# Patient Record
Sex: MALE | Race: WHITE | NOT HISPANIC OR LATINO | Employment: UNEMPLOYED | URBAN - METROPOLITAN AREA
[De-identification: names, ages, dates, MRNs, and addresses within clinical notes are randomized per-mention and may not be internally consistent; named-entity substitution may affect disease eponyms.]

---

## 2018-09-10 ENCOUNTER — APPOINTMENT (EMERGENCY)
Dept: CT IMAGING | Facility: HOSPITAL | Age: 23
DRG: 245 | End: 2018-09-10
Payer: COMMERCIAL

## 2018-09-10 ENCOUNTER — HOSPITAL ENCOUNTER (INPATIENT)
Facility: HOSPITAL | Age: 23
LOS: 3 days | Discharge: HOME | DRG: 245 | End: 2018-09-13
Attending: EMERGENCY MEDICINE | Admitting: SURGERY
Payer: COMMERCIAL

## 2018-09-10 DIAGNOSIS — K52.9 ILEITIS: Primary | ICD-10-CM

## 2018-09-10 DIAGNOSIS — K50.014: ICD-10-CM

## 2018-09-10 DIAGNOSIS — R18.8 ABDOMINAL FLUID COLLECTION: ICD-10-CM

## 2018-09-10 LAB
ANION GAP SERPL CALCULATED.3IONS-SCNC: 8 MMOL/L (ref 4–13)
BACTERIA UR QL AUTO: ABNORMAL /HPF
BASOPHILS # BLD AUTO: 0.03 THOUSANDS/ΜL (ref 0–0.1)
BASOPHILS NFR BLD AUTO: 0 % (ref 0–1)
BILIRUB UR QL STRIP: NEGATIVE
BUN SERPL-MCNC: 10 MG/DL (ref 5–25)
CALCIUM SERPL-MCNC: 9.4 MG/DL (ref 8.3–10.1)
CHLORIDE SERPL-SCNC: 99 MMOL/L (ref 100–108)
CLARITY UR: CLEAR
CO2 SERPL-SCNC: 28 MMOL/L (ref 21–32)
COLOR UR: YELLOW
CREAT SERPL-MCNC: 1.25 MG/DL (ref 0.6–1.3)
EOSINOPHIL # BLD AUTO: 0.01 THOUSAND/ΜL (ref 0–0.61)
EOSINOPHIL NFR BLD AUTO: 0 % (ref 0–6)
ERYTHROCYTE [DISTWIDTH] IN BLOOD BY AUTOMATED COUNT: 12.3 % (ref 11.6–15.1)
GFR SERPL CREATININE-BSD FRML MDRD: 80 ML/MIN/1.73SQ M
GLUCOSE SERPL-MCNC: 107 MG/DL (ref 65–140)
GLUCOSE UR STRIP-MCNC: NEGATIVE MG/DL
HCT VFR BLD AUTO: 45.2 % (ref 36.5–49.3)
HGB BLD-MCNC: 15.8 G/DL (ref 12–17)
HGB UR QL STRIP.AUTO: ABNORMAL
IMM GRANULOCYTES # BLD AUTO: 0.12 THOUSAND/UL (ref 0–0.2)
IMM GRANULOCYTES NFR BLD AUTO: 1 % (ref 0–2)
KETONES UR STRIP-MCNC: NEGATIVE MG/DL
LEUKOCYTE ESTERASE UR QL STRIP: NEGATIVE
LYMPHOCYTES # BLD AUTO: 0.67 THOUSANDS/ΜL (ref 0.6–4.47)
LYMPHOCYTES NFR BLD AUTO: 4 % (ref 14–44)
MCH RBC QN AUTO: 31.9 PG (ref 26.8–34.3)
MCHC RBC AUTO-ENTMCNC: 35 G/DL (ref 31.4–37.4)
MCV RBC AUTO: 91 FL (ref 82–98)
MONOCYTES # BLD AUTO: 1.53 THOUSAND/ΜL (ref 0.17–1.22)
MONOCYTES NFR BLD AUTO: 8 % (ref 4–12)
NEUTROPHILS # BLD AUTO: 16.59 THOUSANDS/ΜL (ref 1.85–7.62)
NEUTS SEG NFR BLD AUTO: 87 % (ref 43–75)
NITRITE UR QL STRIP: NEGATIVE
NON-SQ EPI CELLS URNS QL MICRO: ABNORMAL /HPF
NRBC BLD AUTO-RTO: 0 /100 WBCS
PH UR STRIP.AUTO: 6.5 [PH] (ref 4.5–8)
PLATELET # BLD AUTO: 227 THOUSANDS/UL (ref 149–390)
PMV BLD AUTO: 11.5 FL (ref 8.9–12.7)
POTASSIUM SERPL-SCNC: 3.7 MMOL/L (ref 3.5–5.3)
PROT UR STRIP-MCNC: ABNORMAL MG/DL
RBC # BLD AUTO: 4.96 MILLION/UL (ref 3.88–5.62)
RBC #/AREA URNS AUTO: ABNORMAL /HPF
SODIUM SERPL-SCNC: 135 MMOL/L (ref 136–145)
SP GR UR STRIP.AUTO: <=1.005 (ref 1–1.03)
UROBILINOGEN UR QL STRIP.AUTO: 0.2 E.U./DL
WBC # BLD AUTO: 18.95 THOUSAND/UL (ref 4.31–10.16)
WBC #/AREA URNS AUTO: ABNORMAL /HPF

## 2018-09-10 PROCEDURE — 81001 URINALYSIS AUTO W/SCOPE: CPT | Performed by: EMERGENCY MEDICINE

## 2018-09-10 PROCEDURE — 36415 COLL VENOUS BLD VENIPUNCTURE: CPT | Performed by: EMERGENCY MEDICINE

## 2018-09-10 PROCEDURE — 80048 BASIC METABOLIC PNL TOTAL CA: CPT | Performed by: EMERGENCY MEDICINE

## 2018-09-10 PROCEDURE — 99285 EMERGENCY DEPT VISIT HI MDM: CPT

## 2018-09-10 PROCEDURE — 96374 THER/PROPH/DIAG INJ IV PUSH: CPT

## 2018-09-10 PROCEDURE — 85025 COMPLETE CBC W/AUTO DIFF WBC: CPT | Performed by: EMERGENCY MEDICINE

## 2018-09-10 PROCEDURE — 74177 CT ABD & PELVIS W/CONTRAST: CPT

## 2018-09-10 RX ORDER — ONDANSETRON 2 MG/ML
4 INJECTION INTRAMUSCULAR; INTRAVENOUS EVERY 6 HOURS PRN
Status: DISCONTINUED | OUTPATIENT
Start: 2018-09-10 | End: 2018-09-12

## 2018-09-10 RX ORDER — MORPHINE SULFATE 2 MG/ML
2 INJECTION, SOLUTION INTRAMUSCULAR; INTRAVENOUS EVERY 4 HOURS PRN
Status: DISCONTINUED | OUTPATIENT
Start: 2018-09-10 | End: 2018-09-12

## 2018-09-10 RX ORDER — MORPHINE SULFATE 4 MG/ML
4 INJECTION, SOLUTION INTRAMUSCULAR; INTRAVENOUS ONCE AS NEEDED
Status: DISCONTINUED | OUTPATIENT
Start: 2018-09-10 | End: 2018-09-12

## 2018-09-10 RX ORDER — ONDANSETRON 2 MG/ML
4 INJECTION INTRAMUSCULAR; INTRAVENOUS ONCE AS NEEDED
Status: DISCONTINUED | OUTPATIENT
Start: 2018-09-10 | End: 2018-09-10 | Stop reason: SDUPTHER

## 2018-09-10 RX ORDER — MORPHINE SULFATE 4 MG/ML
4 INJECTION, SOLUTION INTRAMUSCULAR; INTRAVENOUS EVERY 4 HOURS PRN
Status: DISCONTINUED | OUTPATIENT
Start: 2018-09-10 | End: 2018-09-12

## 2018-09-10 RX ORDER — SODIUM CHLORIDE 9 MG/ML
125 INJECTION, SOLUTION INTRAVENOUS CONTINUOUS
Status: DISCONTINUED | OUTPATIENT
Start: 2018-09-10 | End: 2018-09-12

## 2018-09-10 RX ORDER — KETOROLAC TROMETHAMINE 30 MG/ML
15 INJECTION, SOLUTION INTRAMUSCULAR; INTRAVENOUS ONCE
Status: COMPLETED | OUTPATIENT
Start: 2018-09-10 | End: 2018-09-10

## 2018-09-10 RX ADMIN — CEFAZOLIN SODIUM 2000 MG: 2 SOLUTION INTRAVENOUS at 20:22

## 2018-09-10 RX ADMIN — KETOROLAC TROMETHAMINE 15 MG: 30 INJECTION, SOLUTION INTRAMUSCULAR at 17:04

## 2018-09-10 RX ADMIN — METRONIDAZOLE 500 MG: 500 INJECTION, SOLUTION INTRAVENOUS at 21:15

## 2018-09-10 RX ADMIN — SODIUM CHLORIDE 125 ML/HR: 0.9 INJECTION, SOLUTION INTRAVENOUS at 21:15

## 2018-09-10 RX ADMIN — IOHEXOL 100 ML: 350 INJECTION, SOLUTION INTRAVENOUS at 18:20

## 2018-09-10 NOTE — ED PROVIDER NOTES
History  Chief Complaint   Patient presents with    Abdominal Pain     Pt c/o RLQ since Friday  Pt c/o nausea and diarrhea  Pt states pain worsens with movement       History provided by:  Patient  Abdominal Pain   Pain location:  RLQ  Pain quality: aching and dull    Pain radiates to:  Does not radiate  Pain severity:  Moderate  Onset quality:  Gradual  Duration:  3 days  Timing:  Constant  Progression:  Unchanged  Chronicity:  New  Context: not previous surgeries and not sick contacts    Relieved by:  Nothing  Worsened by:  Position changes  Ineffective treatments:  Acetaminophen  Associated symptoms: diarrhea, fever (was 100 4 at urgent care) and nausea    Associated symptoms: no chest pain, no chills, no constipation, no cough, no fatigue, no hematuria, no shortness of breath and no vomiting    Risk factors: has not had multiple surgeries        Prior to Admission Medications   Prescriptions Last Dose Informant Patient Reported? Taking? Acetaminophen (TYLENOL EXTRA STRENGTH PO)   Yes No   Sig: Take 2 tablets by mouth every 6 (six) hours as needed   aspirin 500 MG tablet   Yes No   Sig: Take 500 mg by mouth every 4 (four) hours as needed For pain   meclizine (ANTIVERT) 32 MG tablet   Yes Yes   Sig: Take 32 mg by mouth 3 (three) times a day as needed for dizziness      Facility-Administered Medications: None       History reviewed  No pertinent past medical history  History reviewed  No pertinent surgical history  History reviewed  No pertinent family history  I have reviewed and agree with the history as documented  Social History   Substance Use Topics    Smoking status: Current Every Day Smoker     Packs/day: 0 50     Types: Cigarettes    Smokeless tobacco: Never Used    Alcohol use Yes      Comment: social        Review of Systems   Constitutional: Positive for fever (was 100 4 at urgent care)  Negative for chills and fatigue  Respiratory: Negative for cough and shortness of breath  Cardiovascular: Negative for chest pain  Gastrointestinal: Positive for abdominal pain, diarrhea and nausea  Negative for constipation and vomiting  Genitourinary: Negative for hematuria  All other systems reviewed and are negative  Physical Exam  Physical Exam   Constitutional: He appears well-developed and well-nourished  HENT:   Head: Normocephalic and atraumatic  Eyes: EOM are normal  Pupils are equal, round, and reactive to light  Neck: Neck supple  Cardiovascular: Normal rate  Pulmonary/Chest: Effort normal and breath sounds normal    Abdominal: Soft  Bowel sounds are normal  He exhibits no distension  There is tenderness (RLQ)  There is no rebound  Musculoskeletal: Normal range of motion  He exhibits no edema  Neurological: He is alert  Skin: Skin is warm  No erythema  Vitals reviewed        Vital Signs  ED Triage Vitals [09/10/18 1620]   Temperature Pulse Respirations Blood Pressure SpO2   98 °F (36 7 °C) (!) 111 20 147/73 100 %      Temp Source Heart Rate Source Patient Position - Orthostatic VS BP Location FiO2 (%)   Oral Monitor Sitting Right arm --      Pain Score       6           Vitals:    09/10/18 1620 09/10/18 1851   BP: 147/73 136/70   Pulse: (!) 111 84   Patient Position - Orthostatic VS: Sitting Lying       Visual Acuity      ED Medications  Medications   ceFAZolin (ANCEF) IVPB (premix) 2,000 mg (not administered)   metroNIDAZOLE (FLAGYL) IVPB (premix) 500 mg (not administered)   ketorolac (TORADOL) injection 15 mg (15 mg Intravenous Given 9/10/18 1704)   iohexol (OMNIPAQUE) 350 MG/ML injection (MULTI-DOSE) 100 mL (100 mL Intravenous Given 9/10/18 1820)       Diagnostic Studies  Results Reviewed     Procedure Component Value Units Date/Time    UA w Reflex to Microscopic [[id number]] Collected:  09/10/18 1936    Lab Status:  No result Specimen:  Urine from Urine, Clean Catch     Basic metabolic panel [[id number]]  (Abnormal) Collected:  09/10/18 1646    Lab Status: Final result Specimen:  Blood from Arm, Right Updated:  09/10/18 1706     Sodium 135 (L) mmol/L      Potassium 3 7 mmol/L      Chloride 99 (L) mmol/L      CO2 28 mmol/L      ANION GAP 8 mmol/L      BUN 10 mg/dL      Creatinine 1 25 mg/dL      Glucose 107 mg/dL      Calcium 9 4 mg/dL      eGFR 80 ml/min/1 73sq m     Narrative:         National Kidney Disease Education Program recommendations are as follows:  GFR calculation is accurate only with a steady state creatinine  Chronic Kidney disease less than 60 ml/min/1 73 sq  meters  Kidney failure less than 15 ml/min/1 73 sq  meters  CBC and differential [[id number]]  (Abnormal) Collected:  09/10/18 1646    Lab Status:  Final result Specimen:  Blood from Arm, Right Updated:  09/10/18 1653     WBC 18 95 (H) Thousand/uL      RBC 4 96 Million/uL      Hemoglobin 15 8 g/dL      Hematocrit 45 2 %      MCV 91 fL      MCH 31 9 pg      MCHC 35 0 g/dL      RDW 12 3 %      MPV 11 5 fL      Platelets 133 Thousands/uL      nRBC 0 /100 WBCs      Neutrophils Relative 87 (H) %      Immat GRANS % 1 %      Lymphocytes Relative 4 (L) %      Monocytes Relative 8 %      Eosinophils Relative 0 %      Basophils Relative 0 %      Neutrophils Absolute 16 59 (H) Thousands/µL      Immature Grans Absolute 0 12 Thousand/uL      Lymphocytes Absolute 0 67 Thousands/µL      Monocytes Absolute 1 53 (H) Thousand/µL      Eosinophils Absolute 0 01 Thousand/µL      Basophils Absolute 0 03 Thousands/µL                  CT abdomen pelvis with contrast   Final Result by Josesito Magallanes MD (09/10 1851)      Severe distal ileitis could relate to inflammatory bowel disease  Infectious/inflammatory 1 9 x 1 9 x 9 4 cm (AP by transverse by craniocaudal) inflammatory/infectious fluid pocket/phlegmon or developing abscess identified in the right lower abdominal quadrant  Limited evaluation of the appendix as described above which may be involved           Workstation performed: [id number] Procedures  Procedures       Phone Contacts  ED Phone Contact    ED Course                               MDM  Number of Diagnoses or Management Options  Abdominal fluid collection: new and requires workup  Ileitis: new and requires workup     Amount and/or Complexity of Data Reviewed  Clinical lab tests: ordered and reviewed  Tests in the radiology section of CPT®: ordered and reviewed  Discuss the patient with other providers: yes (Called and spoke with Dr Leonor Bunn, will admit him to his service  Discussed with him and with keep him NPO and give him Ancef and Flagyl )    Risk of Complications, Morbidity, and/or Mortality  Presenting problems: high    Patient Progress  Patient progress: stable    CritCare Time    Disposition  Final diagnoses:   Ileitis   Abdominal fluid collection     Time reflects when diagnosis was documented in both MDM as applicable and the Disposition within this note     Time User Action Codes Description Comment    9/10/2018  7:34 PM Suraj Antony [K52 9] Ileitis     9/10/2018  7:34 PM [address] [R18 8] Abdominal fluid collection       ED Disposition     ED Disposition Condition Comment    Admit  Case was discussed with Dr Leonor Bunn and the patient's admission status was agreed to be Admission Status: inpatient status to the service of Dr Leonor Bunn   Follow-up Information    None         Patient's Medications   Discharge Prescriptions    No medications on file     No discharge procedures on file      ED Provider  Electronically Signed by           Radha Gruber MD  09/10/18 7524

## 2018-09-11 ENCOUNTER — APPOINTMENT (INPATIENT)
Dept: INTERVENTIONAL RADIOLOGY/VASCULAR | Facility: HOSPITAL | Age: 23
DRG: 245 | End: 2018-09-11
Attending: SURGERY
Payer: COMMERCIAL

## 2018-09-11 PROBLEM — K50.014: Status: ACTIVE | Noted: 2018-09-11

## 2018-09-11 LAB
ANION GAP SERPL CALCULATED.3IONS-SCNC: 8 MMOL/L (ref 4–13)
BASOPHILS # BLD AUTO: 0.04 THOUSANDS/ΜL (ref 0–0.1)
BASOPHILS NFR BLD AUTO: 0 % (ref 0–1)
BUN SERPL-MCNC: 9 MG/DL (ref 5–25)
CA-I BLD-SCNC: 1.14 MMOL/L (ref 1.12–1.32)
CALCIUM SERPL-MCNC: 8.6 MG/DL (ref 8.3–10.1)
CHLORIDE SERPL-SCNC: 99 MMOL/L (ref 100–108)
CO2 SERPL-SCNC: 25 MMOL/L (ref 21–32)
CREAT SERPL-MCNC: 1.06 MG/DL (ref 0.6–1.3)
CRP SERPL QL: >90 MG/L
EOSINOPHIL # BLD AUTO: 0.09 THOUSAND/ΜL (ref 0–0.61)
EOSINOPHIL NFR BLD AUTO: 1 % (ref 0–6)
ERYTHROCYTE [DISTWIDTH] IN BLOOD BY AUTOMATED COUNT: 12.1 % (ref 11.6–15.1)
ERYTHROCYTE [SEDIMENTATION RATE] IN BLOOD: 24 MM/HOUR (ref 0–10)
GFR SERPL CREATININE-BSD FRML MDRD: 98 ML/MIN/1.73SQ M
GLUCOSE SERPL-MCNC: 93 MG/DL (ref 65–140)
HCT VFR BLD AUTO: 42 % (ref 36.5–49.3)
HGB BLD-MCNC: 14.5 G/DL (ref 12–17)
IMM GRANULOCYTES # BLD AUTO: 0.08 THOUSAND/UL (ref 0–0.2)
IMM GRANULOCYTES NFR BLD AUTO: 1 % (ref 0–2)
LYMPHOCYTES # BLD AUTO: 1.67 THOUSANDS/ΜL (ref 0.6–4.47)
LYMPHOCYTES NFR BLD AUTO: 14 % (ref 14–44)
MAGNESIUM SERPL-MCNC: 1.7 MG/DL (ref 1.6–2.6)
MCH RBC QN AUTO: 31.7 PG (ref 26.8–34.3)
MCHC RBC AUTO-ENTMCNC: 34.5 G/DL (ref 31.4–37.4)
MCV RBC AUTO: 92 FL (ref 82–98)
MONOCYTES # BLD AUTO: 1.32 THOUSAND/ΜL (ref 0.17–1.22)
MONOCYTES NFR BLD AUTO: 11 % (ref 4–12)
NEUTROPHILS # BLD AUTO: 9 THOUSANDS/ΜL (ref 1.85–7.62)
NEUTS SEG NFR BLD AUTO: 73 % (ref 43–75)
NRBC BLD AUTO-RTO: 0 /100 WBCS
PLATELET # BLD AUTO: 206 THOUSANDS/UL (ref 149–390)
PMV BLD AUTO: 11.7 FL (ref 8.9–12.7)
POTASSIUM SERPL-SCNC: 3.4 MMOL/L (ref 3.5–5.3)
PROCALCITONIN SERPL-MCNC: 0.6 NG/ML
RBC # BLD AUTO: 4.58 MILLION/UL (ref 3.88–5.62)
SODIUM SERPL-SCNC: 132 MMOL/L (ref 136–145)
WBC # BLD AUTO: 12.2 THOUSAND/UL (ref 4.31–10.16)

## 2018-09-11 PROCEDURE — 84145 PROCALCITONIN (PCT): CPT | Performed by: SURGERY

## 2018-09-11 PROCEDURE — 99222 1ST HOSP IP/OBS MODERATE 55: CPT | Performed by: PHYSICIAN ASSISTANT

## 2018-09-11 PROCEDURE — 0W9G30Z DRAINAGE OF PERITONEAL CAVITY WITH DRAINAGE DEVICE, PERCUTANEOUS APPROACH: ICD-10-PCS | Performed by: RADIOLOGY

## 2018-09-11 PROCEDURE — 87077 CULTURE AEROBIC IDENTIFY: CPT | Performed by: SURGERY

## 2018-09-11 PROCEDURE — 85025 COMPLETE CBC W/AUTO DIFF WBC: CPT | Performed by: SURGERY

## 2018-09-11 PROCEDURE — 87186 SC STD MICRODIL/AGAR DIL: CPT | Performed by: SURGERY

## 2018-09-11 PROCEDURE — 85652 RBC SED RATE AUTOMATED: CPT | Performed by: SURGERY

## 2018-09-11 PROCEDURE — 49406 IMAGE CATH FLUID PERI/RETRO: CPT | Performed by: RADIOLOGY

## 2018-09-11 PROCEDURE — 86140 C-REACTIVE PROTEIN: CPT | Performed by: SURGERY

## 2018-09-11 PROCEDURE — C1769 GUIDE WIRE: HCPCS

## 2018-09-11 PROCEDURE — 87070 CULTURE OTHR SPECIMN AEROBIC: CPT | Performed by: SURGERY

## 2018-09-11 PROCEDURE — C1729 CATH, DRAINAGE: HCPCS

## 2018-09-11 PROCEDURE — 82330 ASSAY OF CALCIUM: CPT | Performed by: SURGERY

## 2018-09-11 PROCEDURE — 83735 ASSAY OF MAGNESIUM: CPT | Performed by: SURGERY

## 2018-09-11 PROCEDURE — 99254 IP/OBS CNSLTJ NEW/EST MOD 60: CPT | Performed by: INTERNAL MEDICINE

## 2018-09-11 PROCEDURE — 80048 BASIC METABOLIC PNL TOTAL CA: CPT | Performed by: SURGERY

## 2018-09-11 PROCEDURE — 99152 MOD SED SAME PHYS/QHP 5/>YRS: CPT

## 2018-09-11 PROCEDURE — 87205 SMEAR GRAM STAIN: CPT | Performed by: SURGERY

## 2018-09-11 PROCEDURE — 10030 IMG GID FLU COLL DRG SFT TIS: CPT

## 2018-09-11 RX ORDER — FENTANYL CITRATE 50 UG/ML
INJECTION, SOLUTION INTRAMUSCULAR; INTRAVENOUS CODE/TRAUMA/SEDATION MEDICATION
Status: COMPLETED | OUTPATIENT
Start: 2018-09-11 | End: 2018-09-11

## 2018-09-11 RX ORDER — ACETAMINOPHEN 325 MG/1
650 TABLET ORAL EVERY 6 HOURS PRN
Status: DISCONTINUED | OUTPATIENT
Start: 2018-09-11 | End: 2018-09-13 | Stop reason: HOSPADM

## 2018-09-11 RX ORDER — MIDAZOLAM HYDROCHLORIDE 1 MG/ML
INJECTION INTRAMUSCULAR; INTRAVENOUS CODE/TRAUMA/SEDATION MEDICATION
Status: COMPLETED | OUTPATIENT
Start: 2018-09-11 | End: 2018-09-11

## 2018-09-11 RX ORDER — DEXTROSE, SODIUM CHLORIDE, AND POTASSIUM CHLORIDE 5; .45; .15 G/100ML; G/100ML; G/100ML
125 INJECTION INTRAVENOUS CONTINUOUS
Status: DISCONTINUED | OUTPATIENT
Start: 2018-09-11 | End: 2018-09-12

## 2018-09-11 RX ORDER — NICOTINE 21 MG/24HR
1 PATCH, TRANSDERMAL 24 HOURS TRANSDERMAL DAILY
Status: DISCONTINUED | OUTPATIENT
Start: 2018-09-11 | End: 2018-09-13 | Stop reason: HOSPADM

## 2018-09-11 RX ADMIN — MIDAZOLAM HYDROCHLORIDE 1 MG: 1 INJECTION, SOLUTION INTRAMUSCULAR; INTRAVENOUS at 12:52

## 2018-09-11 RX ADMIN — CEFAZOLIN SODIUM 2000 MG: 2 SOLUTION INTRAVENOUS at 14:08

## 2018-09-11 RX ADMIN — CEFAZOLIN SODIUM 2000 MG: 2 SOLUTION INTRAVENOUS at 21:31

## 2018-09-11 RX ADMIN — DEXTROSE, SODIUM CHLORIDE, AND POTASSIUM CHLORIDE 125 ML/HR: 5; .45; .15 INJECTION INTRAVENOUS at 15:04

## 2018-09-11 RX ADMIN — FENTANYL CITRATE 50 MCG: 50 INJECTION, SOLUTION INTRAMUSCULAR; INTRAVENOUS at 12:52

## 2018-09-11 RX ADMIN — METRONIDAZOLE 500 MG: 500 INJECTION, SOLUTION INTRAVENOUS at 13:13

## 2018-09-11 RX ADMIN — FENTANYL CITRATE 50 MCG: 50 INJECTION, SOLUTION INTRAMUSCULAR; INTRAVENOUS at 12:43

## 2018-09-11 RX ADMIN — METRONIDAZOLE 500 MG: 500 INJECTION, SOLUTION INTRAVENOUS at 06:37

## 2018-09-11 RX ADMIN — MIDAZOLAM HYDROCHLORIDE 1 MG: 1 INJECTION, SOLUTION INTRAMUSCULAR; INTRAVENOUS at 12:47

## 2018-09-11 RX ADMIN — MIDAZOLAM HYDROCHLORIDE 1 MG: 1 INJECTION, SOLUTION INTRAMUSCULAR; INTRAVENOUS at 12:43

## 2018-09-11 RX ADMIN — METRONIDAZOLE 500 MG: 500 INJECTION, SOLUTION INTRAVENOUS at 22:16

## 2018-09-11 RX ADMIN — FENTANYL CITRATE 50 MCG: 50 INJECTION, SOLUTION INTRAMUSCULAR; INTRAVENOUS at 12:47

## 2018-09-11 RX ADMIN — CEFAZOLIN SODIUM 2000 MG: 2 SOLUTION INTRAVENOUS at 05:56

## 2018-09-11 RX ADMIN — MORPHINE SULFATE 2 MG: 2 INJECTION, SOLUTION INTRAMUSCULAR; INTRAVENOUS at 15:08

## 2018-09-11 RX ADMIN — NICOTINE 1 PATCH: 14 PATCH, EXTENDED RELEASE TRANSDERMAL at 17:57

## 2018-09-11 RX ADMIN — FENTANYL CITRATE 25 MCG: 50 INJECTION, SOLUTION INTRAMUSCULAR; INTRAVENOUS at 12:55

## 2018-09-11 NOTE — CONSULTS
Consultation -  [facility]  Letty West 23 y o  male MRN: [id number]  Unit/Bed#: -01 Encounter: [id number]         Reason for Consult / Principal Problem:  Ileitis and possible abscess on CT    HPI:  Shyla Simmons is a 26-year-old male with history of tobacco use who presented to the emergency room yesterday for worsening abdominal pain since Saturday  Patient reports that his pain is located in the right lower quadrant  This is associated with diarrhea less than 5 times at most since the start of his abdominal pain  He denies fevers or chills  He denies melena or hematochezia  On admission, patient presented with a leukocytosis of over 18k  ESR and CRP grossly elevated  Procalcitonin elevated as well  CT of the abdomen pelvis with contrast revealing severe distal ileitis  There is also a 1 9 x 1 9 x 9 4 cm fluid collection developing in the RLQ  Patient is currently admitted under our surgery service  He is on IV Ancef and Flagyl  Fortunately, vitals are stable  He is afebrile  He is sitting comfortably in bed, but does complain of RLQ pain  He has never had an EGD or colonoscopy  He denies chronic abdominal pain or diarrhea  He denies family history of IBD to his knowledge  Review of Systems:    CONSTITUTIONAL: Denies any fever, chills, or rigors  Good appetite, and no recent weight loss  HEENT: No earache or tinnitus  Denies hearing loss or visual disturbances  CARDIOVASCULAR: No chest pain or palpitations  RESPIRATORY: Denies any cough, hemoptysis, shortness of breath or dyspnea on exertion  GASTROINTESTINAL: As noted in the History of Present Illness  GENITOURINARY: No problems with urination  Denies any hematuria or dysuria  NEUROLOGIC: No dizziness or vertigo, denies headaches  MUSCULOSKELETAL: Denies any muscle or joint pain  SKIN: Denies skin rashes or itching  ENDOCRINE: Denies excessive thirst  Denies intolerance to heat or cold    PSYCHOSOCIAL: Denies depression or anxiety  Denies any recent memory loss  Historical Information   History reviewed  No pertinent past medical history  History reviewed  No pertinent surgical history  Social History   History   Alcohol Use    Yes     Comment: social     History   Drug Use    Types: Marijuana     Comment: social     History   Smoking Status    Current Every Day Smoker    Packs/day: 0 50    Types: Cigarettes   Smokeless Tobacco    Never Used     History reviewed  No pertinent family history  Meds/Allergies     Current Facility-Administered Medications   Medication Dose Route Frequency    acetaminophen (TYLENOL) tablet 650 mg  650 mg Oral Q6H PRN    ceFAZolin (ANCEF) IVPB (premix) 2,000 mg  2,000 mg Intravenous Q8H    dextrose 5 % and sodium chloride 0 45 % with KCl 20 mEq/L infusion  125 mL/hr Intravenous Continuous    enoxaparin (LOVENOX) subcutaneous injection 40 mg  40 mg Subcutaneous Daily    metroNIDAZOLE (FLAGYL) IVPB (premix) 500 mg  500 mg Intravenous Q8H    morphine (PF) 4 mg/mL injection 4 mg  4 mg Intravenous Once PRN    morphine (PF) 4 mg/mL injection 4 mg  4 mg Intravenous Q4H PRN    morphine injection 2 mg  2 mg Intravenous Q4H PRN    nicotine (NICODERM CQ) 14 mg/24hr TD 24 hr patch 1 patch  1 patch Transdermal Daily    ondansetron (ZOFRAN) injection 4 mg  4 mg Intravenous Q6H PRN    sodium chloride 0 9 % infusion  125 mL/hr Intravenous Continuous       No Known Allergies      Objective     Blood pressure 125/67, pulse 94, temperature 98 7 °F (37 1 °C), temperature source Oral, resp  rate 18, height 5' 8" (1 727 m), weight 87 1 kg (192 lb 0 3 oz), SpO2 98 %  Intake/Output Summary (Last 24 hours) at 09/11/18 1134  Last data filed at 09/11/18 1051   Gross per 24 hour   Intake             1700 ml   Output                0 ml   Net             1700 ml         PHYSICAL EXAM:      General Appearance:   Alert and oriented x 3   Cooperative, and in no respiratory distress   HEENT:   Normocephalic, atraumatic, anicteric      Neck:  Supple, symmetrical, trachea midline   Lungs:   Clear to auscultation bilaterally   Heart[de-identified]   Regular rate and rhythm   Abdomen:   Soft, right lower quadrant tenderness without guarding, non-distended; normal bowel sounds; no masses, no organomegaly    Genitalia:   Deferred    Rectal:   Deferred    Extremities:  No cyanosis, clubbing or edema    Pulses:  2+ and symmetric all extremities    Skin:  Skin color, texture, turgor normal, no rashes or lesions    Lymph nodes:  No palpable cervical or supraclavicular lymphadenopathy        Lab Results:     Results from last 7 days  Lab Units 09/11/18  0515   WBC Thousand/uL 12 20*   HEMOGLOBIN g/dL 14 5   HEMATOCRIT % 42 0   PLATELETS Thousands/uL 206   NEUTROS PCT % 73   LYMPHS PCT % 14   MONOS PCT % 11   EOS PCT % 1       Results from last 7 days  Lab Units 09/11/18  0515   SODIUM mmol/L 132*   POTASSIUM mmol/L 3 4*   CHLORIDE mmol/L 99*   CO2 mmol/L 25   BUN mg/dL 9   CREATININE mg/dL 1 06   CALCIUM mg/dL 8 6               Imaging Studies: I have personally reviewed pertinent imaging studies  Ct Abdomen Pelvis With Contrast    Result Date: 9/10/2018  Impression: Severe distal ileitis could relate to inflammatory bowel disease  Infectious/inflammatory 1 9 x 1 9 x 9 4 cm (AP by transverse by craniocaudal) inflammatory/infectious fluid pocket/phlegmon or developing abscess identified in the right lower abdominal quadrant  Limited evaluation of the appendix as described above which may be involved  Workstation performed: [id number]       ASSESSMENT and PLAN:      1) Distal ileitis with possible abscess in the right lower quadrant - Patient is admitted under surgery service  Fortunately his leukocytosis is down trended today to 12  He was started on Ancef and Flagyl IV  He is a smoker  I explained to the patient that these findings are very suggestive of Crohn's disease   He denies chronic abdominal symptoms   - IV antibiotics per primary team  - IR consult placed for possible drainage of this fluid collection seen on CT  - Stool enteric panel, C diff and fecal calprotectin  - Explained to patient that he will need a colonoscopy and CT versus MRI enterography to confirm diagnosis of IBD   - Smoking cessation   - Appreciate surgery recommendations       The patient was seen and examined by Dr Leonora Yates, all sun medical decisions were made with Dr Leonora Yates  Thank you for allowing us to participate in the care of this pleasant patient  We will follow up with you closely

## 2018-09-11 NOTE — PLAN OF CARE
Problem: DISCHARGE PLANNING - CARE MANAGEMENT  Goal: Discharge to post-acute care or home with appropriate resources  INTERVENTIONS:  - Conduct assessment to determine patient/family and health care team treatment goals, and need for post-acute services based on payer coverage, community resources, and patient preferences, and barriers to discharge  - Address psychosocial, clinical, and financial barriers to discharge as identified in assessment in conjunction with the patient/family and health care team  - Arrange appropriate level of post-acute services according to patients   needs and preference and payer coverage in collaboration with the physician and health care team  - Communicate with and update the patient/family, physician, and health care team regarding progress on the discharge plan  - Arrange appropriate transportation to post-acute venues  Outcome: Progressing  CM met with pt at bedside  Pt lives alone in a one story house with 2 MICHELINE  He has no problem navigating steps and is independent with ADL's  He uses no DME's  He used [facility] for OP/PT for knee issues  Never used [facility] services  Denies substance abuse or mental health issues  He is a smoker-PPD-1/2 x 9 yrs  He does not have a PCP  CM offered to set up an appoint, but pt refuses  States that he usually goes to an urgent care center when he gets sick  He uses [facility] in [place] and has no problem with his co-pays  He does not have a POA or Advanced Directive and does not want info at this time  Pt does not work or drive  A family member transports to appointments and will transport home when he is medically cleared  Pt c/o abd pain at the present time and rates it a 4/10  CM discussed d/c needs including HH, but pt does not feel he will need this service  CM will follow through hospitalization      CM reviewed discharge planning process including the following: identifying help at home, patient preference for discharge planning needs, pharmacy preference, and availability of treatment team to discuss questions or concerns patient and/or family may have regarding understanding medications and recognizing signs and symptoms once discharged  CM also encouraged patient to follow up with all recommended appointments after discharge  Patient advised of importance for patient and family to participate in managing patients medical well being  CM name and role reviewed  Discharge Checklist reviewed and CM will continue to monitor for progress toward discharge goals in nursing and provider rounds

## 2018-09-11 NOTE — PROGRESS NOTES
38-year-old male patient with progressive abdominal pain  Computed tomography imaging showed a 1 9 x 1 9 x 9 4 cm inflammatory/infectious fluid pocket in the right lower quadrant  This is associated with severe wall thickening of the distal ileum extending to the ileocecal junction  There is questionable infectious/inflammatory thickening of the proximal appendix  Ileitis with developing abscess suspected  Chart reviewed, imaging reviewed, labs checked  Stable for percutaneous drainage of the right lower quadrant fluid collection

## 2018-09-11 NOTE — H&P
GENERAL SURGERY HISTORY AND PHYSICAL      Herberth Padron 23 y o  male MRN: [id number]  Unit/Bed#: -01 Encounter: [id number]  Original pt status was entered in error,      Assessment/Plan   Ileitis of the terminal ileum with poss abscess abscess evidenced by  [id number] fluid accumulation/phlegmon in the right lower quadrant  infectious vs inflammatory    Hyponatremia   Hypokalemia  -replete electrolytes continue to monitor  -cont to monitor abd exam    -NPO  -IVF  -IV Ancef/Flagy  -NPO  -consult IR for poss drainage  Chief Complaint:  I had pain in the right side of my abdomen that started 4 days ago  HPI  Herberth Padron is a 23 y o male who presented with 4 day history of right lower abdominal pain  he states he  had diarrhea, nausea and vomiting along with this pain  The pain is sharp and got worse but does not radiate  Tylenol did not relieve the pain  Pain is increased with moving  He was seen at Urgent Care and had temp of 100 4   He denies cough, fatigue, SOB, constipation bloody stools, associated with food, recent travel or sick contacts  This is the first episode  He denies family history of inflammatory bowel disease  No abd surgical history     WBC 18 on admission,  12 today  Pt was started on Ancef and Flagyl   Ct Abdomen Pelvis With Contrast    Result Date: 9/10/2018  Impression: Severe distal ileitis could relate to inflammatory bowel disease  Infectious/inflammatory 1 9 x 1 9 x 9 4 cm (AP by transverse by craniocaudal) inflammatory/infectious fluid pocket/phlegmon or developing abscess identified in the right lower abdominal quadrant  Limited evaluation of the appendix as described above which may be involved  Addendum:  Per Grandmother and guardian, pt's aunt has Crohn's and another relative had colon CA  Historical Information   History reviewed  No pertinent past medical history  History reviewed  No pertinent surgical history    Social History   History   Alcohol Use    Yes     Comment: social     History   Drug Use    Types: Marijuana     Comment: social     History   Smoking Status    Current Every Day Smoker    Packs/day: 0 50    Types: Cigarettes   Smokeless Tobacco    Never Used     Family History: no pertinent family history  No Known Allergies  Meds/Allergies   current meds:   Current Facility-Administered Medications   Medication Dose Route Frequency    ceFAZolin (ANCEF) IVPB (premix) 2,000 mg  2,000 mg Intravenous Q8H    metroNIDAZOLE (FLAGYL) IVPB (premix) 500 mg  500 mg Intravenous Q8H    morphine (PF) 4 mg/mL injection 4 mg  4 mg Intravenous Once PRN    morphine (PF) 4 mg/mL injection 4 mg  4 mg Intravenous Q4H PRN    morphine injection 2 mg  2 mg Intravenous Q4H PRN    ondansetron (ZOFRAN) injection 4 mg  4 mg Intravenous Q6H PRN    sodium chloride 0 9 % infusion  125 mL/hr Intravenous Continuous         ROS:  12 set ROS reviewed and negative except for:      Objective   Vitals: Blood pressure 125/67, pulse 94, temperature 98 7 °F (37 1 °C), temperature source Oral, resp  rate 18, height 5' 8" (1 727 m), weight 87 1 kg (192 lb 0 3 oz), SpO2 98 %  ,Body mass index is 29 2 kg/m²  No intake or output data in the 24 hours ending 09/11/18 0958  Invasive Devices     Peripheral Intravenous Line            Peripheral IV 09/10/18 Right Antecubital less than 1 day                Physical Exam:    General appearance: alert, appears stated age and cooperative  HEENT: PERRLA, EOMI, sclera clear, anicterus, oral mucosa is  dry  Back: no tenderness,deformity,   Lungs:clear throughout  Heart[de-identified] RRR, S1, S2 normal, no murmur  Abdomen: soft, non distended  Tenderness in RLQ no masses, NBS  Extremities: FROM no joint deformities, motor,sensory intact,pedal edema none   Skin: no rashes or lesion     Neurologic: CN II-XII grossly intact, no tremor, affect appropriate    Lab Results:   CBC with diff:   Lab Results   Component Value Date    WBC 12 20 (H) 09/11/2018    HGB 14 5 09/11/2018    HCT 42 0 09/11/2018    MCV 92 09/11/2018     09/11/2018    MCH 31 7 09/11/2018    MCHC 34 5 09/11/2018    RDW 12 1 09/11/2018    MPV 11 7 09/11/2018    NRBC 0 09/11/2018   , BMP/CMP:   Lab Results   Component Value Date     (L) 09/11/2018    K 3 4 (L) 09/11/2018    CL 99 (L) 09/11/2018    CO2 25 09/11/2018    BUN 9 09/11/2018    CREATININE 1 06 09/11/2018    CALCIUM 8 6 09/11/2018    EGFR 98 09/11/2018   , Urinalysis:   Lab Results   Component Value Date    COLORU Yellow 09/10/2018    CLARITYU Clear 09/10/2018    SPECGRAV <=1 005 09/10/2018    PHUR 6 5 09/10/2018    LEUKOCYTESUR Negative 09/10/2018    NITRITE Negative 09/10/2018    PROTEINUA Trace (A) 09/10/2018    GLUCOSEU Negative 09/10/2018    KETONESU Negative 09/10/2018    BILIRUBINUR Negative 09/10/2018    BLOODU Small (A) 09/10/2018     Imaging Studies: Ct Abdomen Pelvis With Contrast    Result Date: 9/10/2018  Impression: Severe distal ileitis could relate to inflammatory bowel disease  Infectious/inflammatory 1 9 x 1 9 x 9 4 cm (AP by transverse by craniocaudal) inflammatory/infectious fluid pocket/phlegmon or developing abscess identified in the right lower abdominal quadrant  Limited evaluation of the appendix as described above which may be involved   Workstation performed: [id number]       VTE Prophylaxis: Sequential compression device (Venodyne)  and Enoxaparin (Lovenox)     Code Status: full code    Advance Directive and Living Will:      Power of :    POLST:      Ramo Cotton PA-C  9/11/2018

## 2018-09-11 NOTE — BRIEF OP NOTE (RAD/CATH)
IR RIGHT LOWER QUADRANT ABSCESS DRAINAGE:  Procedure Note    PATIENT NAME: Tamar Hanna  : 1995  MRN: [id number]     Pre-op Diagnosis:   1  Ileitis    2  Abdominal fluid collection      Post-op Diagnosis:   1  Ileitis    2  Abdominal fluid collection        Surgeon:   Emil Briseno MD  Assistants:     Estimated Blood Loss:  None  Findings:  Small longitudinal fluid collection in the right lower quadrant correlating well with prior computed tomography imaging  [facility] all-purpose drainage catheter placed  Specimens:  3 mL cloudy hector colored fluid  Specimen submitted for laboratory analysis  Complications:  None      Anesthesia: Conscious sedation and Joe Benitez MD     Date: 2018  Time: 1:08 PM

## 2018-09-11 NOTE — CASE MANAGEMENT
Thank you,  [address]  Utilization Review Department  Phone: [phone]; Fax [phone]  ATTENTION: Please call with any questions or concerns to [phone]  and carefully follow the prompts so that you are directed to the right person  Send all requests for admission clinical reviews, approved or denied determinations and any other requests to fax [phone]  All voicemails are confidential      Initial Clinical Review    Admission: Date/Time/Statement: inpatient 9/10/18 @ 1936     Orders Placed This Encounter   Procedures    Inpatient Admission (expected length of stay for this patient is greater than two midnights)     Standing Status:   Standing     Number of Occurrences:   1     Order Specific Question:   Admitting Physician     Answer:   Jimmie Sensor [[id number]]     Order Specific Question:   Level of Care     Answer:   Med Surg [[id number]]     Order Specific Question:   Estimated length of stay     Answer:   More than 2 Midnights     Order Specific Question:   Certification     Answer:   I certify that inpatient services are medically necessary for this patient for a duration of greater than two midnights  See H&P and MD Progress Notes for additional information about the patient's course of treatment  ED Arrival Information     Expected Arrival Acuity Means of Arrival Escorted By Service Admission Type    - 9/10/2018 16:13 Urgent [facility]-General Urgent    Arrival Complaint    abdominal pain        Chief Complaint   Patient presents with    Abdominal Pain     Pt c/o RLQ since Friday  Pt c/o nausea and diarrhea  Pt states pain worsens with movement       History of Illness: 22 yo m to ED from home due to sharp R lower abd pain x 4 days assoc with diarrhea, n/v  Unrelieved by tylenol, increased with moving  Seen at urgent care, temp 100 4     Tender RLQ abd    ED Vital Signs:   ED Triage Vitals [09/10/18 1620]   Temperature Pulse Respirations Blood Pressure SpO2   98 °F (36 7 °C) (!) 111 20 147/73 100 %      Temp Source Heart Rate Source Patient Position - Orthostatic VS BP Location FiO2 (%)   Oral Monitor Sitting Right arm --      Pain Score       6        Wt Readings from Last 1 Encounters:   09/10/18 87 1 kg (192 lb 0 3 oz)       Vital Signs (abnormal): HR 84, 100, 101, 100, 94    Abnormal Labs/Diagnostic Test Results:   9/10: na 135   Cl 99   Wbc 18 95     UA: sm bld   Tr prot   0-1 rbc   occ bacteria/epithelials  CT a&p: Severe distal ileitis could relate to inflammatory bowel disease  Infectious/inflammatory 1 9 x 1 9 x 9 4 cm (AP by transverse by craniocaudal) inflammatory/infectious fluid pocket/phlegmon or developing abscess identified in the right lower abdominal quadrant  9/11: na 132   k 3 4   Cl 99   Wbc 12 2   esd rate 24   Crp>90   procalcitonin    6      ED Treatment:   Medication Administration from 09/10/2018 1613 to 09/10/2018 2036       Date/Time Order Dose Route Action Action by Comments     09/10/2018 1704 ketorolac (TORADOL) injection 15 mg 15 mg Intravenous Given Teri Canchola RN      09/10/2018 2022 ceFAZolin (ANCEF) IVPB (premix) 2,000 mg 2,000 mg Intravenous New Bag Mary Dudley RN           Past Medical/Surgical History: none      Admitting Diagnosis: Ileitis [K52 9]  Abdominal pain [R10 9]  Abdominal fluid collection [R18 8]    Age/Sex: 23 y o  male    Assessment/Plan: 24 yo m to ED from home, admitted due to ileitis of terminal ileum with developing abscess in RLQ, infectious vs inflammatory  [id number] fluid accumulation/phlegmon in RLQ  NPO, IVF, IV antbx, cons IR for possible drainage, replete lytes       Admission Orders:  Scheduled Meds:   Current Facility-Administered Medications:  acetaminophen 650 mg Oral Q6H PRN   cefazolin 2,000 mg Intravenous Q8H   dextrose 5 % and sodium chloride 0 45 % with KCl 20 mEq/L 125 mL/hr Intravenous Continuous   enoxaparin 40 mg Subcutaneous Daily   metroNIDAZOLE 500 mg Intravenous Q8H morphine injection 4 mg Intravenous Once PRN   morphine injection 4 mg Intravenous Q4H PRN   morphine injection 2 mg Intravenous Q4H PRN   nicotine 1 patch Transdermal Daily   ondansetron 4 mg Intravenous Q6H PRN   sodium chloride 125 mL/hr Intravenous Continuous     scd's  Incentive spirom hourly wa  oob as eli  I/O  IR image guided drainage w/tube placement  Ion ca, cbc, bmp, mg in am  NPO  Cons GI 9/10 @5148

## 2018-09-11 NOTE — SOCIAL WORK
CM met with pt at bedside  Pt lives alone in a one story house with 2 MICHELINE  He has no problem navigating steps and is independent with ADL's  He uses no DME's  He used [facility] for OP/PT for knee issues  Never used [facility] services  Denies substance abuse or mental health issues  He is a smoker-PPD-1/2 x 9 yrs  He does not have a PCP  CM offered to set up an appoint, but pt refuses  States that he usually goes to an urgent care center when he gets sick  He uses [facility] in [place] and has no problem with his co-pays  He does not have a POA or Advanced Directive and does not want info at this time  Pt does not work or drive  A family member transports to appointments and will transport home when he is medically cleared  Pt c/o abd pain at the present time and rates it a 4/10  CM discussed d/c needs including HH, but pt does not feel he will need this service  CM will follow through hospitalization  CM reviewed discharge planning process including the following: identifying help at home, patient preference for discharge planning needs, pharmacy preference, and availability of treatment team to discuss questions or concerns patient and/or family may have regarding understanding medications and recognizing signs and symptoms once discharged  CM also encouraged patient to follow up with all recommended appointments after discharge  Patient advised of importance for patient and family to participate in managing patients medical well being  CM name and role reviewed  Discharge Checklist reviewed and CM will continue to monitor for progress toward discharge goals in nursing and provider rounds

## 2018-09-12 ENCOUNTER — TELEPHONE (OUTPATIENT)
Dept: GASTROENTEROLOGY | Facility: CLINIC | Age: 23
End: 2018-09-12

## 2018-09-12 LAB
ANION GAP SERPL CALCULATED.3IONS-SCNC: 6 MMOL/L (ref 4–13)
BASOPHILS # BLD AUTO: 0.03 THOUSANDS/ΜL (ref 0–0.1)
BASOPHILS NFR BLD AUTO: 0 % (ref 0–1)
BUN SERPL-MCNC: 6 MG/DL (ref 5–25)
CA-I BLD-SCNC: 1.13 MMOL/L (ref 1.12–1.32)
CALCIUM SERPL-MCNC: 9.1 MG/DL (ref 8.3–10.1)
CHLORIDE SERPL-SCNC: 104 MMOL/L (ref 100–108)
CO2 SERPL-SCNC: 27 MMOL/L (ref 21–32)
CREAT SERPL-MCNC: 0.95 MG/DL (ref 0.6–1.3)
EOSINOPHIL # BLD AUTO: 0.11 THOUSAND/ΜL (ref 0–0.61)
EOSINOPHIL NFR BLD AUTO: 1 % (ref 0–6)
ERYTHROCYTE [DISTWIDTH] IN BLOOD BY AUTOMATED COUNT: 12.2 % (ref 11.6–15.1)
GFR SERPL CREATININE-BSD FRML MDRD: 112 ML/MIN/1.73SQ M
GLUCOSE SERPL-MCNC: 107 MG/DL (ref 65–140)
HCT VFR BLD AUTO: 39.9 % (ref 36.5–49.3)
HGB BLD-MCNC: 13.5 G/DL (ref 12–17)
IMM GRANULOCYTES # BLD AUTO: 0.05 THOUSAND/UL (ref 0–0.2)
IMM GRANULOCYTES NFR BLD AUTO: 1 % (ref 0–2)
LYMPHOCYTES # BLD AUTO: 1.41 THOUSANDS/ΜL (ref 0.6–4.47)
LYMPHOCYTES NFR BLD AUTO: 16 % (ref 14–44)
MAGNESIUM SERPL-MCNC: 2.2 MG/DL (ref 1.6–2.6)
MCH RBC QN AUTO: 31.4 PG (ref 26.8–34.3)
MCHC RBC AUTO-ENTMCNC: 33.8 G/DL (ref 31.4–37.4)
MCV RBC AUTO: 93 FL (ref 82–98)
MONOCYTES # BLD AUTO: 1.03 THOUSAND/ΜL (ref 0.17–1.22)
MONOCYTES NFR BLD AUTO: 12 % (ref 4–12)
NEUTROPHILS # BLD AUTO: 6.13 THOUSANDS/ΜL (ref 1.85–7.62)
NEUTS SEG NFR BLD AUTO: 70 % (ref 43–75)
NRBC BLD AUTO-RTO: 0 /100 WBCS
PLATELET # BLD AUTO: 253 THOUSANDS/UL (ref 149–390)
PMV BLD AUTO: 11.3 FL (ref 8.9–12.7)
POTASSIUM SERPL-SCNC: 3.7 MMOL/L (ref 3.5–5.3)
RBC # BLD AUTO: 4.3 MILLION/UL (ref 3.88–5.62)
SODIUM SERPL-SCNC: 137 MMOL/L (ref 136–145)
WBC # BLD AUTO: 8.76 THOUSAND/UL (ref 4.31–10.16)

## 2018-09-12 PROCEDURE — 83735 ASSAY OF MAGNESIUM: CPT | Performed by: SURGERY

## 2018-09-12 PROCEDURE — 82330 ASSAY OF CALCIUM: CPT | Performed by: SURGERY

## 2018-09-12 PROCEDURE — 99233 SBSQ HOSP IP/OBS HIGH 50: CPT | Performed by: PHYSICIAN ASSISTANT

## 2018-09-12 PROCEDURE — 99233 SBSQ HOSP IP/OBS HIGH 50: CPT | Performed by: INTERNAL MEDICINE

## 2018-09-12 PROCEDURE — 80048 BASIC METABOLIC PNL TOTAL CA: CPT | Performed by: SURGERY

## 2018-09-12 PROCEDURE — 85025 COMPLETE CBC W/AUTO DIFF WBC: CPT | Performed by: SURGERY

## 2018-09-12 RX ORDER — OXYCODONE HYDROCHLORIDE AND ACETAMINOPHEN 5; 325 MG/1; MG/1
1 TABLET ORAL EVERY 4 HOURS PRN
Status: DISCONTINUED | OUTPATIENT
Start: 2018-09-12 | End: 2018-09-13 | Stop reason: HOSPADM

## 2018-09-12 RX ADMIN — CEFAZOLIN SODIUM 2000 MG: 2 SOLUTION INTRAVENOUS at 05:07

## 2018-09-12 RX ADMIN — METRONIDAZOLE 500 MG: 500 INJECTION, SOLUTION INTRAVENOUS at 06:24

## 2018-09-12 RX ADMIN — METRONIDAZOLE 500 MG: 500 INJECTION, SOLUTION INTRAVENOUS at 20:33

## 2018-09-12 RX ADMIN — DEXTROSE, SODIUM CHLORIDE, AND POTASSIUM CHLORIDE 125 ML/HR: 5; .45; .15 INJECTION INTRAVENOUS at 10:31

## 2018-09-12 RX ADMIN — CEFAZOLIN SODIUM 2000 MG: 2 SOLUTION INTRAVENOUS at 20:33

## 2018-09-12 RX ADMIN — METRONIDAZOLE 500 MG: 500 INJECTION, SOLUTION INTRAVENOUS at 15:11

## 2018-09-12 RX ADMIN — CEFAZOLIN SODIUM 2000 MG: 2 SOLUTION INTRAVENOUS at 12:46

## 2018-09-12 NOTE — MEDICAL STUDENT
MEDICAL STUDENT  Inpatient Progress Note for TRAINING ONLY  Not Part of Legal Medical Record       Progress Note - Makayla Dunlap 23 y o  male MRN: [id number]    Unit/Bed#: -01 Encounter: [id number]  Addendum:  Assessment:  1  Ileitis with abscess secondary to possible Crohn's Disease as evidenced by elevated CRP (>90 H), elevated sed rate (24 H), and pending fecal calprotectin - IR drainage of abscess present   2  Leukocytosis - resolved, patient's WBC count is currently at 8 76, down from 12 20 on 09/11    Plan:  1  Ileitis with abscess secondary to possible Crohn's Disease as evidenced by elevated CRP (>90 H), elevated sed rate (24 H), and pending fecal calprotectin - IR drainage of abscess present   - Patient to follow up with GI on outpatient basis and have colonoscopy done in 6-8 weeks  - GI to manage patient symptomatically after discharge   - Continue current antibiotic regimen, IV fluids, abscess drain, and pain control   - Monitor I&Os and continue serial abdominal exams   - Advance as tolerated from full liquid diet   - Have patient ambulate  - Monitor drain output, tentative plan to discontinue drain before discharge   2  Leukocytosis - resolved, patient's WBC count is currently at 8 76, down from 12 20 on 09/11  - Continue current antibiotic regimen and serial labs     Subjective:   Patient is doing better, pain is significantly reduced  Patient tolerating full liquid diet, no nausea or abdominal present with current full liquid diet  Patient denies chest pain, shortness of breath, constipation, nausea, vomiting, diarrhea, blood in stool, and urinary pain  Patient admits to passing gas, but has no pain with the passing of gas  Objective:     Vitals: Blood pressure 128/66, pulse 73, temperature 98 3 °F (36 8 °C), temperature source Oral, resp  rate 19, height 5' 8" (1 727 m), weight 87 1 kg (192 lb 0 3 oz), SpO2 100 %  ,Body mass index is 29 2 kg/m²        Intake/Output Summary (Last 24 hours) at 09/12/18 1207  Last data filed at 09/12/18 1000   Gross per 24 hour   Intake              780 ml   Output                0 ml   Net              780 ml       Physical Exam: /66 (BP Location: Right arm)   Pulse 73   Temp 98 3 °F (36 8 °C) (Oral)   Resp 19   Ht 5' 8" (1 727 m)   Wt 87 1 kg (192 lb 0 3 oz)   SpO2 100%   BMI 29 20 kg/m²   General appearance: alert and oriented, in no acute distress  Lungs: clear to auscultation bilaterally, no rhonchi or wheezes present   Heart: regular rate and rhythm, S1, S2 normal, no murmur, click, rub or gallop  Abdomen: soft, non-distended abdomen  No rigidity or guarding present  Normoactive bowel sounds in all 4 quadrants  No tenderness present  Drain present on right side of abdomen, no erythema or purulent drainage present  Color of fluid in drain is serous  Extremities: extremities normal, warm and well-perfused; no cyanosis, clubbing, or edema     Invasive Devices     Peripheral Intravenous Line            Peripheral IV 09/10/18 Right Antecubital 1 day          Drain            Closed/Suction Drain Right RLQ Bulb 10 2 Fr  less than 1 day                Lab, Imaging and other studies: I have personally reviewed pertinent reports  Ct Abdomen Pelvis With Contrast    Result Date: 9/10/2018  Impression: Severe distal ileitis could relate to inflammatory bowel disease  Infectious/inflammatory 1 9 x 1 9 x 9 4 cm (AP by transverse by craniocaudal) inflammatory/infectious fluid pocket/phlegmon or developing abscess identified in the right lower abdominal quadrant  Limited evaluation of the appendix as described above which may be involved  Workstation performed: [id number]     Ir Image Guided Drainage W Tube Placement    Result Date: 9/11/2018  Impression: Impression:  Ultrasound and fluoroscopy-guided placement of a 10-Kosovan all-purpose drainage catheter in a small right lower quadrant fluid collection    Aspiration yielded approximately 3 mL of cloudy hector-colored fluid  Workstation performed: [id number]      Results for Damion Hidalgo (MRN [id number]) as of 9/12/2018 12:10   Ref  Range 9/10/2018 16:46 9/11/2018 05:15 9/12/2018 05:00   Sodium Latest Ref Range: 136 - 145 mmol/L 135 (L) 132 (L) 137   Potassium Latest Ref Range: 3 5 - 5 3 mmol/L 3 7 3 4 (L) 3 7   Chloride Latest Ref Range: 100 - 108 mmol/L 99 (L) 99 (L) 104   CO2 Latest Ref Range: 21 - 32 mmol/L 28 25 27   Anion Gap Latest Ref Range: 4 - 13 mmol/L 8 8 6   BUN Latest Ref Range: 5 - 25 mg/dL 10 9 6   Creatinine Latest Ref Range: 0 60 - 1 30 mg/dL 1 25 1 06 0 95   Glucose Latest Ref Range: 65 - 140 mg/dL 107 93 107   Calcium Latest Ref Range: 8 3 - 10 1 mg/dL 9 4 8 6 9 1   CALCIUM IONIZED Latest Ref Range: 1 12 - 1 32 mmol/L  1 14 1 13   eGFR Latest Units: ml/min/1 73sq m 80 98 112   Magnesium Latest Ref Range: 1 6 - 2 6 mg/dL  1 7 2 2   WBC Latest Ref Range: 4 31 - 10 16 Thousand/uL 18 95 (H) 12 20 (H) 8 76   RBC Latest Ref Range: 3 88 - 5 62 Million/uL 4 96 4 58 4 30   Hemoglobin Latest Ref Range: 12 0 - 17 0 g/dL 15 8 14 5 13 5   HCT Latest Ref Range: 36 5 - 49 3 % 45 2 42 0 39 9   MCV Latest Ref Range: 82 - 98 fL 91 92 93   MCH Latest Ref Range: 26 8 - 34 3 pg 31 9 31 7 31 4   MCHC Latest Ref Range: 31 4 - 37 4 g/dL 35 0 34 5 33 8   RDW Latest Ref Range: 11 6 - 15 1 % 12 3 12 1 12 2   Platelets Latest Ref Range: 149 - 390 Thousands/uL 227 206 253   MPV Latest Ref Range: 8 9 - 12 7 fL 11 5 11 7 11 3     Results for Damion Hidalgo (MRN [id number]) as of 9/12/2018 12:10   Ref  Range 9/11/2018 05:15   ERYTHROCYTE SEDIMENTATION RATE Latest Ref Range: 0 - 10 mm/hour 24 (H)      Results for Damion Hidalgo (MRN [id number]) as of 9/12/2018 12:10   Ref  Range 9/11/2018 05:15   C-REACTIVE PROTEIN Latest Ref Range: <3 0 mg/L >90 0 (H)   Procalcitonin Latest Ref Range: <=0 25 ng/ml 0 60 (H)        Results for Damion Hidalgo (MRN [id number]) as of 9/12/2018 12:10   Ref   Range 9/10/2018 16:46 9/11/2018 05:15 9/12/2018 05:00   WBC Latest Ref Range: 4 31 - 10 16 Thousand/uL 18 95 (H) 12 20 (H) 8 76   RBC Latest Ref Range: 3 88 - 5 62 Million/uL 4 96 4 58 4 30   Hemoglobin Latest Ref Range: 12 0 - 17 0 g/dL 15 8 14 5 13 5   HCT Latest Ref Range: 36 5 - 49 3 % 45 2 42 0 39 9   MCV Latest Ref Range: 82 - 98 fL 91 92 93   MCH Latest Ref Range: 26 8 - 34 3 pg 31 9 31 7 31 4   MCHC Latest Ref Range: 31 4 - 37 4 g/dL 35 0 34 5 33 8   RDW Latest Ref Range: 11 6 - 15 1 % 12 3 12 1 12 2   Platelets Latest Ref Range: 149 - 390 Thousands/uL 227 206 253   MPV Latest Ref Range: 8 9 - 12 7 fL 11 5 11 7 11 3   nRBC Latest Units: /100 WBCs 0 0 0

## 2018-09-12 NOTE — PROGRESS NOTES
Progress Note - General Surgery   Lambert Tejada 23 y o  male MRN: [id number]  Unit/Bed#: -01 Encounter: [id number]    Assessment/Plan  Ileitis with intra-abdominal abscess likely secondary to Crohn's disease, IR drain day 1  Patient states that he is feeling much better, he is much less pain in the right lower quadrant  No nausea vomiting, diarrhea  C reactive protein greater than 90, procalcitonin 0 60, WBCs 8 7, electrolytes normalized  -continue IV antibiotics Ancef and Flagyl  -continue IR drain  -DC IV pain meds, changed to p o  pain meds  -IV fluids  -advance to full liquid  -ambulate and trimble  -nursing staff teaching for IR drain care      Chief Complaint:  I am feeling much better, I have less pain in my abdomen,      Objective Directed Exam:  Lungs clear, RRR, abdomen is soft, IR drain with scant amount of serosanguineous drainage  Mild tenderness in right lower quadrant  Normoactive bowel sounds    Blood pressure 128/66, pulse 73, temperature 98 3 °F (36 8 °C), temperature source Oral, resp  rate 19, height 5' 8" (1 727 m), weight 87 1 kg (192 lb 0 3 oz), SpO2 100 %  ,Body mass index is 29 2 kg/m²        Intake/Output Summary (Last 24 hours) at 09/12/18 0838  Last data filed at 09/11/18 1820   Gross per 24 hour   Intake             2150 ml   Output                0 ml   Net             2150 ml       Invasive Devices     Peripheral Intravenous Line            Peripheral IV 09/10/18 Right Antecubital 1 day          Drain            Closed/Suction Drain Right RLQ Bulb 10 2 Fr  less than 1 day                Physical Exam:   General Appearance:    Alert and orientated x 3, cooperative, no distress   Lungs:     Clear to auscultation bilaterally, respirations unlabored    Heart:    Regular rate and rhythm   Abdomen:     As above     Wound/Dressing:  C/d/i,       Extremities:   Extremities normal,  no cyanosis or edema   Pulses:   2+ and symmetric all extremities, no calf tenderness   Skin:   Skin color, texture, turgor normal, no rashes or lesions   Neurologic:   CNII-XII intact, normal strength, sensation and reflexes     Throughout, affect appropriate                           Labs:   CBC with diff: Lab Results   Component Value Date    WBC 8 76 09/12/2018    HGB 13 5 09/12/2018    HCT 39 9 09/12/2018    MCV 93 09/12/2018     09/12/2018    MCH 31 4 09/12/2018    MCHC 33 8 09/12/2018    RDW 12 2 09/12/2018    MPV 11 3 09/12/2018    NRBC 0 09/12/2018   ,   BMP/CMP:  Lab Results   Component Value Date     09/12/2018    K 3 7 09/12/2018     09/12/2018    CO2 27 09/12/2018    BUN 6 09/12/2018    CREATININE 0 95 09/12/2018    CALCIUM 9 1 09/12/2018    EGFR 112 09/12/2018   ,   Lipid Panel: No results found for: CHOL,   Coags: No results found for: PT, PTT, INR,     Blood Culture: No results found for: BLOODCX,   Urinalysis: Lab Results   Component Value Date    COLORU Yellow 09/10/2018    CLARITYU Clear 09/10/2018    SPECGRAV <=1 005 09/10/2018    PHUR 6 5 09/10/2018    LEUKOCYTESUR Negative 09/10/2018    NITRITE Negative 09/10/2018    PROTEINUA Trace (A) 09/10/2018    GLUCOSEU Negative 09/10/2018    KETONESU Negative 09/10/2018    BILIRUBINUR Negative 09/10/2018    BLOODU Small (A) 09/10/2018   ,   Urine Culture: No results found for: URINECX,   Wound Culure: No results found for: WOUNDCULT      Imaging: Ct Abdomen Pelvis With Contrast    Result Date: 9/10/2018  Impression: Severe distal ileitis could relate to inflammatory bowel disease  Infectious/inflammatory 1 9 x 1 9 x 9 4 cm (AP by transverse by craniocaudal) inflammatory/infectious fluid pocket/phlegmon or developing abscess identified in the right lower abdominal quadrant  Limited evaluation of the appendix as described above which may be involved   Workstation performed: [id number]     Ir Image Guided Drainage W Tube Placement    Result Date: 9/11/2018  Impression: Impression:  Ultrasound and fluoroscopy-guided placement of a 10-[place]ian all-purpose drainage catheter in a small right lower quadrant fluid collection  Aspiration yielded approximately 3 mL of cloudy hector-colored fluid   Workstation performed: [id number]         Rachel Groves PA-C  9/12/2018

## 2018-09-12 NOTE — PROGRESS NOTES
Progress Note  Jose Humphrey 23 y o  male MRN: [id number]  Unit/Bed#: -01 Encounter: [id number]    Subjective:  Overall he is more awake and having less pain  He is having no melena hematochezia no diarrhea no constipation  He reports that he had no problems prior to the hospital stay  He has no fevers  He reports he is going home tomorrow  Objective:     Vitals:   Vitals:    09/12/18 0700   BP: 128/66   Pulse: 73   Resp: 19   Temp: 98 3 °F (36 8 °C)   SpO2: 100%   ,Body mass index is 29 2 kg/m²        Intake/Output Summary (Last 24 hours) at 09/12/18 1404  Last data filed at 09/12/18 1000   Gross per 24 hour   Intake              780 ml   Output                0 ml   Net              780 ml       Physical Exam:     General Appearance: Alert, appears stated age and cooperative  Lungs: Clear to auscultation bilaterally, no rales or rhonchi, no labored breathing/accessory muscle use  Heart: Regular rate and rhythm, S1, S2 normal, no murmur, click, rub or gallop  Abdomen: Soft, tender, non-distended; bowel sounds normal; no masses or no organomegaly; has drain  Extremities: No cyanosis, edema    Invasive Devices     Peripheral Intravenous Line            Peripheral IV 09/10/18 Right Antecubital 1 day          Drain            Closed/Suction Drain Right RLQ Bulb 10 2 Fr  1 day                Lab Results:  Admission on 09/10/2018   Component Date Value    WBC 09/10/2018 18 95*    RBC 09/10/2018 4 96     Hemoglobin 09/10/2018 15 8     Hematocrit 09/10/2018 45 2     MCV 09/10/2018 91     MCH 09/10/2018 31 9     MCHC 09/10/2018 35 0     RDW 09/10/2018 12 3     MPV 09/10/2018 11 5     Platelets 42/06/5934 227     nRBC 09/10/2018 0     Neutrophils Relative 09/10/2018 87*    Immat GRANS % 09/10/2018 1     Lymphocytes Relative 09/10/2018 4*    Monocytes Relative 09/10/2018 8     Eosinophils Relative 09/10/2018 0     Basophils Relative 09/10/2018 0     Neutrophils Absolute 09/10/2018 16 59*    Immature Grans Absolute 09/10/2018 0 12     Lymphocytes Absolute 09/10/2018 0 67     Monocytes Absolute 09/10/2018 1 53*    Eosinophils Absolute 09/10/2018 0 01     Basophils Absolute 09/10/2018 0 03     Sodium 09/10/2018 135*    Potassium 09/10/2018 3 7     Chloride 09/10/2018 99*    CO2 09/10/2018 28     ANION GAP 09/10/2018 8     BUN 09/10/2018 10     Creatinine 09/10/2018 1 25     Glucose 09/10/2018 107     Calcium 09/10/2018 9 4     eGFR 09/10/2018 80     Color, UA 09/10/2018 Yellow     Clarity, UA 09/10/2018 Clear     Specific Gravity, UA 09/10/2018 <=1 005     pH, UA 09/10/2018 6 5     Leukocytes, UA 09/10/2018 Negative     Nitrite, UA 09/10/2018 Negative     Protein, UA 09/10/2018 Trace*    Glucose, UA 09/10/2018 Negative     Ketones, UA 09/10/2018 Negative     Urobilinogen, UA 09/10/2018 0 2     Bilirubin, UA 09/10/2018 Negative     Blood, UA 09/10/2018 Small*    RBC, UA 09/10/2018 0-1*    WBC, UA 09/10/2018 None Seen     Epithelial Cells 09/10/2018 Occasional     Bacteria, UA 09/10/2018 Occasional     Calcium, Ionized 09/11/2018 1 14     WBC 09/11/2018 12 20*    RBC 09/11/2018 4 58     Hemoglobin 09/11/2018 14 5     Hematocrit 09/11/2018 42 0     MCV 09/11/2018 92     MCH 09/11/2018 31 7     MCHC 09/11/2018 34 5     RDW 09/11/2018 12 1     MPV 09/11/2018 11 7     Platelets 08/77/1227 206     nRBC 09/11/2018 0     Neutrophils Relative 09/11/2018 73     Immat GRANS % 09/11/2018 1     Lymphocytes Relative 09/11/2018 14     Monocytes Relative 09/11/2018 11     Eosinophils Relative 09/11/2018 1     Basophils Relative 09/11/2018 0     Neutrophils Absolute 09/11/2018 9 00*    Immature Grans Absolute 09/11/2018 0 08     Lymphocytes Absolute 09/11/2018 1 67     Monocytes Absolute 09/11/2018 1 32*    Eosinophils Absolute 09/11/2018 0 09     Basophils Absolute 09/11/2018 0 04     Sodium 09/11/2018 132*    Potassium 09/11/2018 3 4*    Chloride 09/11/2018 99*    CO2 09/11/2018 25     ANION GAP 09/11/2018 8     BUN 09/11/2018 9     Creatinine 09/11/2018 1 06     Glucose 09/11/2018 93     Calcium 09/11/2018 8 6     eGFR 09/11/2018 98     Magnesium 09/11/2018 1 7     CRP 09/11/2018 >90 0*    Sed Rate 09/11/2018 24*    Procalcitonin 09/11/2018 0 60*    Body Fluid Culture, Ster* 09/11/2018 No growth     Gram Stain Result 09/11/2018 1+ Polys     Gram Stain Result 09/11/2018 1+ Gram negative rods     Calcium, Ionized 09/12/2018 1 13     WBC 09/12/2018 8 76     RBC 09/12/2018 4 30     Hemoglobin 09/12/2018 13 5     Hematocrit 09/12/2018 39 9     MCV 09/12/2018 93     MCH 09/12/2018 31 4     MCHC 09/12/2018 33 8     RDW 09/12/2018 12 2     MPV 09/12/2018 11 3     Platelets 18/78/4104 253     nRBC 09/12/2018 0     Neutrophils Relative 09/12/2018 70     Immat GRANS % 09/12/2018 1     Lymphocytes Relative 09/12/2018 16     Monocytes Relative 09/12/2018 12     Eosinophils Relative 09/12/2018 1     Basophils Relative 09/12/2018 0     Neutrophils Absolute 09/12/2018 6 13     Immature Grans Absolute 09/12/2018 0 05     Lymphocytes Absolute 09/12/2018 1 41     Monocytes Absolute 09/12/2018 1 03     Eosinophils Absolute 09/12/2018 0 11     Basophils Absolute 09/12/2018 0 03     Sodium 09/12/2018 137     Potassium 09/12/2018 3 7     Chloride 09/12/2018 104     CO2 09/12/2018 27     ANION GAP 09/12/2018 6     BUN 09/12/2018 6     Creatinine 09/12/2018 0 95     Glucose 09/12/2018 107     Calcium 09/12/2018 9 1     eGFR 09/12/2018 112     Magnesium 09/12/2018 2 2        Imaging Studies:   I have personally reviewed pertinent imaging studies  Ct Abdomen Pelvis With Contrast    Result Date: 9/10/2018  Narrative: CT ABDOMEN AND PELVIS WITH IV CONTRAST INDICATION:   Right lower quadrant abdominal pain  Nausea vomiting diarrhea COMPARISON:  None  TECHNIQUE:  CT examination of the abdomen and pelvis was performed   Axial, sagittal, and coronal 2D reformatted images were created from the source data and submitted for interpretation  Radiation dose length product (DLP) for this visit:  675 mGy-cm   This examination, like all CT scans performed in the [facility], was performed utilizing techniques to minimize radiation dose exposure, including the use of iterative reconstruction and automated exposure control  IV Contrast:  100 mL of iohexol (OMNIPAQUE) Enteric Contrast:  Enteric contrast was not administered  FINDINGS: ABDOMEN LOWER CHEST:  No clinically significant abnormality identified in the visualized lower chest  LIVER/BILIARY TREE:  Unremarkable  GALLBLADDER:  No calcified gallstones  No pericholecystic inflammatory change  SPLEEN:  Unremarkable  PANCREAS:  Unremarkable  ADRENAL GLANDS:  Unremarkable  KIDNEYS/URETERS:  Unremarkable  No hydronephrosis  STOMACH AND BOWEL:  Severe wall thickening of the distal ileum extending to the ileocecal junction with surrounding inflammatory fluid in the right lower abdominal quadrant  Infectious/inflammatory 1 9 x 1 9 x 9 4 cm (AP by transverse by craniocaudal) inflammatory/infectious fluid pocket/phlegmon or developing abscess identified in the right lower abdominal quadrant  APPENDIX:  Questionable infectious/inflammatory thickening of the proximal appendix ABDOMINOPELVIC CAVITY:  No free air  VESSELS:  Unremarkable for patient's age  PELVIS REPRODUCTIVE ORGANS:  Unremarkable for patient's age  URINARY BLADDER:  Nondistended inadequately evaluated  ABDOMINAL WALL/INGUINAL REGIONS:  Unremarkable  OSSEOUS STRUCTURES:  No acute fracture or destructive osseous lesion  Impression: Severe distal ileitis could relate to inflammatory bowel disease  Infectious/inflammatory 1 9 x 1 9 x 9 4 cm (AP by transverse by craniocaudal) inflammatory/infectious fluid pocket/phlegmon or developing abscess identified in the right lower abdominal quadrant   Limited evaluation of the appendix as described above which may be involved  Workstation performed: [id number]     Ir Image Guided Drainage W Tube Placement    Result Date: 9/11/2018  Narrative: Procedure:  Ultrasound-guided percutaneous drainage of a right lower quadrant fluid collection  History:  20-year-old male patient with progressive abdominal pain  Computed tomography imaging showed findings suggestive of ileitis with associated right lower quadrant fluid collection  Also questionable inflammation of the proximal aspect of the appendix  Comments: The patient was identified  The procedure, risks, benefits and alternatives were explained to the patient who expressed understanding and signed an informed consent  The patient was placed in the supine position  Maximum sterile barrier technique including cap, mask, gown and gloves, as well as a large sterile sheet was used  Appropriate hand hygiene was performed  The right lower quadrant was prepped using 2% chlorhexidine for cutaneous antisepsis and draped in the usual sterile fashion  The ultrasound probe was introduced to the field in a sterile sleeve  Sterile ultrasound gel was used  Real-time ultrasound examination was performed showing a small right lower quadrant fluid collection  The location and dimensions correlated well with prior computed tomography imaging  Permanent ultrasound images were recorded  Timeout verification, with all participants present, was performed prior to any intervention  1% lidocaine (10 mL) was used for local anesthesia  Under real-time ultrasound guidance, the fluid collection was accessed using a 19-gauge needle  Aspiration  yielded cloudy hector-colored fluid  A specimen was submitted for laboratory analysis  Under fluoroscopic guidance, the needle was removed over a 0 035 inch tiny J-tipped Eden guidewire  The subcutaneous tract was serially dilated to 10-Qatari  A 10-Qatari all-purpose drainage catheter was advanced into the small collection    Approximately 3 mL of said fluid were drained  The catheter was sutured at the skin exit site and connected to LAURIE bulb suction  A sterile dressing was applied  The patient tolerated the procedure well  Fluoroscopy time: 0 1 minutes  Image count: 5  Contrast: None  Estimated blood loss: None  VTe prophylaxis: Short duration procedure not requiring VTe prophylaxis  Complications: None  Medications:  IV moderate sedation was utilized for 10 minutes  The patient's cardiorespiratory status was monitored before, during and after the procedure by interventional radiology nursing staff, under my direct supervision  Impression: Impression:  Ultrasound and fluoroscopy-guided placement of a 10-Mozambican all-purpose drainage catheter in a small right lower quadrant fluid collection  Aspiration yielded approximately 3 mL of cloudy hector-colored fluid  Workstation performed: [id number]         Assessment & Plan  Principal Problem:    Ileitis, terminal, with abscess [facility])    He is a 27-year-old male with intra-abdominal abscess likely secondary to small bowel Crohn's status post IR drainage  Advance diet per surgery  Continue antibiotics  We are recommending outpatient follow-up  We are planning to do a colonoscopy in 3-4 weeks and initiate a prednisone course and biologic medication pending re CT with resolution of abscess  The risks and benefits of current biologics were gone over in great detail    We went over the risks of lymphoma and infection immune suppression  We will order the pre biologic labs    Neil Damon MD  9/12/2018,2:04 PM

## 2018-09-12 NOTE — TELEPHONE ENCOUNTER
----- Message from Lily Allen MD sent at 9/12/2018  2:07 PM EDT -----   Pt MUST see me or Michaelle in 2-3 weeks; put him on with her then; tell me if problem; leaving hospital tomorrow

## 2018-09-13 ENCOUNTER — TELEPHONE (OUTPATIENT)
Dept: GASTROENTEROLOGY | Facility: CLINIC | Age: 23
End: 2018-09-13

## 2018-09-13 VITALS
WEIGHT: 192.02 LBS | SYSTOLIC BLOOD PRESSURE: 120 MMHG | TEMPERATURE: 98.3 F | HEIGHT: 68 IN | RESPIRATION RATE: 18 BRPM | DIASTOLIC BLOOD PRESSURE: 56 MMHG | OXYGEN SATURATION: 96 % | BODY MASS INDEX: 29.1 KG/M2 | HEART RATE: 62 BPM

## 2018-09-13 LAB
ANION GAP SERPL CALCULATED.3IONS-SCNC: 6 MMOL/L (ref 4–13)
BASOPHILS # BLD AUTO: 0.05 THOUSANDS/ΜL (ref 0–0.1)
BASOPHILS NFR BLD AUTO: 1 % (ref 0–1)
BUN SERPL-MCNC: 8 MG/DL (ref 5–25)
CA-I BLD-SCNC: 1.09 MMOL/L (ref 1.12–1.32)
CALCIUM SERPL-MCNC: 8.6 MG/DL (ref 8.3–10.1)
CHLORIDE SERPL-SCNC: 106 MMOL/L (ref 100–108)
CO2 SERPL-SCNC: 28 MMOL/L (ref 21–32)
CREAT SERPL-MCNC: 0.89 MG/DL (ref 0.6–1.3)
EOSINOPHIL # BLD AUTO: 0.39 THOUSAND/ΜL (ref 0–0.61)
EOSINOPHIL NFR BLD AUTO: 5 % (ref 0–6)
ERYTHROCYTE [DISTWIDTH] IN BLOOD BY AUTOMATED COUNT: 12.3 % (ref 11.6–15.1)
GFR SERPL CREATININE-BSD FRML MDRD: 120 ML/MIN/1.73SQ M
GLUCOSE SERPL-MCNC: 109 MG/DL (ref 65–140)
HAV IGM SER QL: NORMAL
HBV CORE IGM SER QL: NORMAL
HBV SURFACE AG SER QL: NORMAL
HCT VFR BLD AUTO: 37.3 % (ref 36.5–49.3)
HCV AB SER QL: NORMAL
HGB BLD-MCNC: 12.5 G/DL (ref 12–17)
IMM GRANULOCYTES # BLD AUTO: 0.05 THOUSAND/UL (ref 0–0.2)
IMM GRANULOCYTES NFR BLD AUTO: 1 % (ref 0–2)
LYMPHOCYTES # BLD AUTO: 1.47 THOUSANDS/ΜL (ref 0.6–4.47)
LYMPHOCYTES NFR BLD AUTO: 18 % (ref 14–44)
MAGNESIUM SERPL-MCNC: 2 MG/DL (ref 1.6–2.6)
MCH RBC QN AUTO: 31.4 PG (ref 26.8–34.3)
MCHC RBC AUTO-ENTMCNC: 33.5 G/DL (ref 31.4–37.4)
MCV RBC AUTO: 94 FL (ref 82–98)
MONOCYTES # BLD AUTO: 1.06 THOUSAND/ΜL (ref 0.17–1.22)
MONOCYTES NFR BLD AUTO: 13 % (ref 4–12)
NEUTROPHILS # BLD AUTO: 5.23 THOUSANDS/ΜL (ref 1.85–7.62)
NEUTS SEG NFR BLD AUTO: 62 % (ref 43–75)
NRBC BLD AUTO-RTO: 0 /100 WBCS
PLATELET # BLD AUTO: 280 THOUSANDS/UL (ref 149–390)
PMV BLD AUTO: 10.5 FL (ref 8.9–12.7)
POTASSIUM SERPL-SCNC: 3.3 MMOL/L (ref 3.5–5.3)
RBC # BLD AUTO: 3.98 MILLION/UL (ref 3.88–5.62)
SODIUM SERPL-SCNC: 140 MMOL/L (ref 136–145)
WBC # BLD AUTO: 8.25 THOUSAND/UL (ref 4.31–10.16)

## 2018-09-13 PROCEDURE — 82330 ASSAY OF CALCIUM: CPT | Performed by: SURGERY

## 2018-09-13 PROCEDURE — 99238 HOSP IP/OBS DSCHRG MGMT 30/<: CPT | Performed by: SURGERY

## 2018-09-13 PROCEDURE — 80074 ACUTE HEPATITIS PANEL: CPT | Performed by: PHYSICIAN ASSISTANT

## 2018-09-13 PROCEDURE — 86480 TB TEST CELL IMMUN MEASURE: CPT | Performed by: PHYSICIAN ASSISTANT

## 2018-09-13 PROCEDURE — 83735 ASSAY OF MAGNESIUM: CPT | Performed by: SURGERY

## 2018-09-13 PROCEDURE — 85025 COMPLETE CBC W/AUTO DIFF WBC: CPT | Performed by: SURGERY

## 2018-09-13 PROCEDURE — 80048 BASIC METABOLIC PNL TOTAL CA: CPT | Performed by: SURGERY

## 2018-09-13 RX ORDER — AMOXICILLIN AND CLAVULANATE POTASSIUM 875; 125 MG/1; MG/1
1 TABLET, FILM COATED ORAL EVERY 12 HOURS SCHEDULED
Status: DISCONTINUED | OUTPATIENT
Start: 2018-09-13 | End: 2018-09-13 | Stop reason: HOSPADM

## 2018-09-13 RX ORDER — AMOXICILLIN AND CLAVULANATE POTASSIUM 875; 125 MG/1; MG/1
1 TABLET, FILM COATED ORAL EVERY 12 HOURS SCHEDULED
Qty: 21 TABLET | Refills: 0 | Status: SHIPPED | OUTPATIENT
Start: 2018-09-13 | End: 2018-09-24

## 2018-09-13 RX ORDER — POTASSIUM CHLORIDE 20 MEQ/1
40 TABLET, EXTENDED RELEASE ORAL ONCE
Status: COMPLETED | OUTPATIENT
Start: 2018-09-13 | End: 2018-09-13

## 2018-09-13 RX ADMIN — POTASSIUM CHLORIDE 40 MEQ: 1500 TABLET, EXTENDED RELEASE ORAL at 12:37

## 2018-09-13 RX ADMIN — CEFAZOLIN SODIUM 2000 MG: 2 SOLUTION INTRAVENOUS at 05:05

## 2018-09-13 RX ADMIN — METRONIDAZOLE 500 MG: 500 INJECTION, SOLUTION INTRAVENOUS at 05:04

## 2018-09-13 RX ADMIN — AMOXICILLIN AND CLAVULANATE POTASSIUM 1 TABLET: 875; 125 TABLET, FILM COATED ORAL at 12:37

## 2018-09-13 NOTE — DISCHARGE SUMMARY
Discharge Summary - Makayla Dunlap 23 y o  male MRN: [id number]    Unit/Bed#: -01 Encounter: [id number]    Admission Date: 9/10/2018   Discharge Date: 09/13/18    Admitting Diagnosis:   Ileitis [K52 9]  Abdominal pain [R10 9]  Abdominal fluid collection [R18 8]    Discharge Diagnoses: Principal Problem:    Ileitis, terminal, with abscess [facility]      Consultations:   Gastroenterology    Procedures Performed:   IR drainage of RLQ intra-abdominal abscess    Hospital Course: Makayla Dunlap is a 23 y o  male admitted for presented to the emergency department with 4 days of right lower quadrant abdominal pain with associated diarrhea, nausea, and vomiting  Pain was described as sharp, nonradiating and was not relieved with Tylenol  Presented to urgent care and had temperature of a 100 4°  He presented to the emergency department and was found to have leukocytosis of 18 9 and CT findings of severe wall thickening of distal ileum with surrounding inflammatory fluid as well as 1 9 x 1 9 x 9 4 cm fluid pocket/abscess in the right lower quadrant  Patient was started on IV antibiotics and IR consult was placed  Gastroenterology was also consulted  Patient seen by IR and 3 cc cloudy hector colored fluid was drained and pigtail drain was placed  Diet was advanced as tolerated in the patient's leukocytosis improved on IV antibiotics  Patient was seen by Gastroenterology and concern for Crohn's disease was expressed due to abdominal abscess likely secondary to a transmural inflammation at the terminal ileum  Patient will be seen as an outpatient with Gastroenterology who is recommending follow-up CT enterography and colonoscopy as well as the future steroid course, and treatment for IBD  On 9/13/2018  Patient had a leukocytosis, he was afebrile, tolerating a diet with no nausea or vomiting  LAURIE drain at site of a abscess was draining clear straw-colored serous fluid  LAURIE drain was removed without difficulty    Site is clean/dry/intact, no surrounding erythema or drainage  Dry dressing was placed and patient was instructed on wound care  He was provided with a script for Augmentin to complete a 2 week course of antibiotics  Due to patient's insurance he will need follow-up out at work for further workup  This was explained and discussed with the patient and his family are in understanding  All questions were answered to their satisfaction  The patient was seen and examined on the day of discharge:   See progress note for 9/13/18    Condition at Discharge: stable     Discharge instructions/Information to patient and family:   See after visit summary for information provided to patient and family  Provisions for Follow-Up Care:  See after visit summary for information related to follow-up care and any pertinent home health orders  Disposition: See After Visit Summary for discharge disposition information  Planned Readmission: No    Discharge Statement   I spent 30 minutes discharging the patient  This time was spent on the day of discharge  I had direct contact with the patient on the day of discharge  Additional documentation is required if more than 30 minutes were spent on discharge  Discharge Medications:  See after visit summary for reconciled discharge medications provided to patient and family        Linton Primrose, PA-C  9/13/2018

## 2018-09-13 NOTE — PLAN OF CARE
DISCHARGE PLANNING     Discharge to home or other facility with appropriate resources Progressing        DISCHARGE PLANNING - CARE MANAGEMENT     Discharge to post-acute care or home with appropriate resources Progressing        GASTROINTESTINAL - ADULT     Minimal or absence of nausea and/or vomiting Progressing     Maintains or returns to baseline bowel function Progressing     Maintains adequate nutritional intake Progressing     Establish and maintain optimal ostomy function Progressing        INFECTION - ADULT     Absence or prevention of progression during hospitalization Progressing     Absence of fever/infection during neutropenic period Progressing        Knowledge Deficit     Patient/family/caregiver demonstrates understanding of disease process, treatment plan, medications, and discharge instructions Progressing        METABOLIC, FLUID AND ELECTROLYTES - ADULT     Electrolytes maintained within normal limits Progressing     Fluid balance maintained Progressing     Glucose maintained within target range Progressing        PAIN - ADULT     Verbalizes/displays adequate comfort level or baseline comfort level Progressing        SAFETY ADULT     Patient will remain free of falls Progressing     Maintain or return to baseline ADL function Progressing     Maintain or return mobility status to optimal level Progressing

## 2018-09-13 NOTE — PLAN OF CARE
DISCHARGE PLANNING     Discharge to home or other facility with appropriate resources Adequate for Discharge        DISCHARGE PLANNING - CARE MANAGEMENT     Discharge to post-acute care or home with appropriate resources Adequate for Discharge        GASTROINTESTINAL - ADULT     Minimal or absence of nausea and/or vomiting Adequate for Discharge     Maintains or returns to baseline bowel function Adequate for Discharge     Maintains adequate nutritional intake Adequate for Discharge     Establish and maintain optimal ostomy function Adequate for Discharge        INFECTION - ADULT     Absence or prevention of progression during hospitalization Adequate for Discharge     Absence of fever/infection during neutropenic period Adequate for Discharge        Knowledge Deficit     Patient/family/caregiver demonstrates understanding of disease process, treatment plan, medications, and discharge instructions Adequate for Discharge        METABOLIC, FLUID AND ELECTROLYTES - ADULT     Electrolytes maintained within normal limits Adequate for Discharge     Fluid balance maintained Adequate for Discharge     Glucose maintained within target range Adequate for Discharge        PAIN - ADULT     Verbalizes/displays adequate comfort level or baseline comfort level Adequate for Discharge        SAFETY ADULT     Patient will remain free of falls Adequate for Discharge     Maintain or return to baseline ADL function Adequate for Discharge     Maintain or return mobility status to optimal level Adequate for Discharge

## 2018-09-13 NOTE — SOCIAL WORK
CM met with pt, grandmother Mauro zhou and friend Yamilka Wood at bedside  Pt is very upset  States that he was told by Dr Donato Diaz yesterday that the LAURIE drain would be removed today and he could go home  Yamilka Wood has been waiting patiently to transport home  CM spoke to surgical PA and she said that Dr Hutton is covering today and he needs to eval pt prior to d/c  Dr Hutton notified that pt wants to see him asap  Pt wants to be discharged without [facility]

## 2018-09-13 NOTE — PROGRESS NOTES
Progress Note -Surgery PA  Bernard Lambert 23 y o  male MRN: [id number]  Unit/Bed#: -01 Encounter: [id number]      Assessment   Ileitis with intra-abdominal abscess  - seen by GI; likely 2/2 IBD possible Crohn's  Plan for Colonoscopy in 3-4 weeeks  - IR drain with clear serous fluid; drain site with no erythema or tenderness  - afebrile, VSS, stable    Plan   - Patient stable and OK for discharge today  Discussed that attending surgeon recommended leaving drain for continued monitoring of drainage  Patient frustrated because he states he was told yesterday he could have the drain removed  He would like it removed today  I encouraged him to wait and speak with the surgeon about this  - patient will be discharged on oral antibiotics to complete a 2 week course  - follow-up with GI for further workup of ileitis and possible IBD/Crohn's disease   - also discussed establishing care with a primary care physician to help with management of patient's health     ______________________________________________________________________    Subjective:   Patient feeling well today  Denies fever, chills  Abdominal pain has resolved  Tolerating a regular diet with no nausea or vomiting  Having regular bowel movements  No diarrhea or constipation  No hematochezia or melena  Objective:       Vitals:  /56 (BP Location: Left arm)   Pulse 62   Temp 98 3 °F (36 8 °C) (Oral)   Resp 18   Ht 5' 8" (1 727 m)   Wt 87 1 kg (192 lb 0 3 oz)   SpO2 96%   BMI 29 20 kg/m²     I/Os:  I/O last 3 completed shifts: In: 330 [P O :330]  Out: -     I/O this shift:  In: 250 [P O :240;  I V :10]  Out: -     Invasive Devices     Peripheral Intravenous Line            Peripheral IV 09/10/18 Right Antecubital 2 days          Drain            Closed/Suction Drain Right RLQ Bulb 10 2 Fr  1 day                Medications:  Current Facility-Administered Medications   Medication Dose Route Frequency    acetaminophen (TYLENOL) tablet 650 mg  650 mg Oral Q6H PRN    ceFAZolin (ANCEF) IVPB (premix) 2,000 mg  2,000 mg Intravenous Q8H    enoxaparin (LOVENOX) subcutaneous injection 40 mg  40 mg Subcutaneous Daily    metroNIDAZOLE (FLAGYL) IVPB (premix) 500 mg  500 mg Intravenous Q8H    nicotine (NICODERM CQ) 14 mg/24hr TD 24 hr patch 1 patch  1 patch Transdermal Daily    oxyCODONE-acetaminophen (PERCOCET) 5-325 mg per tablet 1 tablet  1 tablet Oral Q4H PRN    potassium chloride (K-DUR,KLOR-CON) CR tablet 40 mEq  40 mEq Oral Once                 Lab Results and Cultures:   CBC with diff:   Lab Results   Component Value Date    WBC 8 25 09/13/2018    HGB 12 5 09/13/2018    HCT 37 3 09/13/2018    MCV 94 09/13/2018     09/13/2018    MCH 31 4 09/13/2018    MCHC 33 5 09/13/2018    RDW 12 3 09/13/2018    MPV 10 5 09/13/2018    NRBC 0 09/13/2018       BMP/CMP:  Lab Results   Component Value Date     09/13/2018    K 3 3 (L) 09/13/2018     09/13/2018    CO2 28 09/13/2018    BUN 8 09/13/2018    CREATININE 0 89 09/13/2018    CALCIUM 8 6 09/13/2018    EGFR 120 09/13/2018     Urinalysis:   Lab Results   Component Value Date    COLORU Yellow 09/10/2018    CLARITYU Clear 09/10/2018    SPECGRAV <=1 005 09/10/2018    PHUR 6 5 09/10/2018    LEUKOCYTESUR Negative 09/10/2018    NITRITE Negative 09/10/2018    PROTEINUA Trace (A) 09/10/2018    GLUCOSEU Negative 09/10/2018    KETONESU Negative 09/10/2018    BILIRUBINUR Negative 09/10/2018    BLOODU Small (A) 09/10/2018          Physical Exam:  General Appearance:    Alert and orientated x 3, cooperative, no distress, appears stated age   Lungs:     Clear to auscultation bilaterally, respirations unlabored, no wheezes    Heart:    Regular rate and rhythm, S1 and S2 normal, no murmur   Abdomen:    Normoactive BS, soft, non tender, non rigid, no masses, no palpated organomegaly  IR Drain site:  No erythema or drainage drain site, nontender  LAURIE drain with clear straw-colored serous fluid     Extremities: Extremities normal, no calf tenderness, no cyanosis or edema   Pulses:   2+ and symmetric all extremities   Skin:   Skin color, texture, turgor normal, no rashes   Neurologic:   CNII-XII intact, normal strength, affect appropriate       Imaging:  Ct Abdomen Pelvis With Contrast    Result Date: 9/10/2018  Impression: Severe distal ileitis could relate to inflammatory bowel disease  Infectious/inflammatory 1 9 x 1 9 x 9 4 cm (AP by transverse by craniocaudal) inflammatory/infectious fluid pocket/phlegmon or developing abscess identified in the right lower abdominal quadrant  Limited evaluation of the appendix as described above which may be involved  Workstation performed: [id number]     Ir Image Guided Drainage W Tube Placement    Result Date: 9/11/2018  Impression: Impression:  Ultrasound and fluoroscopy-guided placement of a 10-Romanian all-purpose drainage catheter in a small right lower quadrant fluid collection  Aspiration yielded approximately 3 mL of cloudy hector-colored fluid   Workstation performed: [id number]       VTE Pharmacologic Prophylaxis: Enoxaparin (Lovenox)  VTE Mechanical Prophylaxis: sequential compression device    Satish Barry PA-C   9/13/2018

## 2018-09-13 NOTE — DISCHARGE INSTRUCTIONS
Follow up with Surgery in 1 week for Drain  Follow drain care as instructed below  Follow up with Gastroenterology  Crohn Disease   WHAT YOU NEED TO KNOW:   Crohn disease is an inflammatory disease of the digestive system  Crohn disease causes the lining of your intestines to become inflamed  The lining of your mouth, esophagus, or stomach may also be affected by Crohn disease  DISCHARGE INSTRUCTIONS:   Return to the emergency department if:   · You suddenly have trouble breathing  · You vomit blood, or your vomit looks like coffee grounds  · You have a fast heart rate, fast breathing, or are too dizzy to stand  · You have severe pain in your stomach  Contact your healthcare provider if:   · You have tar-colored bowel movements or you see blood in your bowel movements  · You have a fever or chills  · The pain in your abdomen does not go away or gets worse after you take medicine  · Your abdomen is swollen  · You are losing weight without trying  · You have questions or concerns about your condition or care  Medicines:   · Medicines  may be used to decrease inflammation in your digestive tract  You may need antibiotics to treat or prevent an infection and antidiarrheal medicine to decrease diarrhea  Immunosuppressants may also be given to slow your immune system  · Take your medicine as directed  Contact your healthcare provider if you think your medicine is not helping or if you have side effects  Tell him of her if you are allergic to any medicine  Keep a list of the medicines, vitamins, and herbs you take  Include the amounts, and when and why you take them  Bring the list or the pill bottles to follow-up visits  Carry your medicine list with you in case of an emergency  Follow up with your healthcare provider as directed:  Record the number of bowel movements you have each day and describe the color and form (liquid, soft, or hard)   Write down if you saw blood in your bowel movement  Bring the record with you when you see your healthcare provider  Write down your questions so you remember to ask them during your visits  Nutrition:  Keep a record of everything you eat and drink  Include any symptoms the food or drink causes or makes worse  You may need to avoid certain foods  Dairy, alcohol, hot spices, and high-fiber foods are common examples of foods that may worsen your symptoms  Your healthcare provider may recommend that you take vitamins or minerals  Always ask your healthcare provider before you take vitamins or nutritional supplements  Do not smoke:  Nicotine and other chemicals in cigarettes and cigars can cause lung damage and increase your risk for Crohn disease  Ask your healthcare provider for information if you currently smoke and need help to quit  E-cigarettes or smokeless tobacco still contain nicotine  Talk to your healthcare provider before you use these products  © 2017 [employer] Information is for End User's use only and may not be sold, redistributed or otherwise used for commercial purposes  All illustrations and images included in CareNotes® are the copyrighted property of [employer]  or [employer]  The above information is an  only  It is not intended as medical advice for individual conditions or treatments  Talk to your doctor, nurse or pharmacist before following any medical regimen to see if it is safe and effective for you  Jalil-Olmedo Drain Care   WHAT YOU NEED TO KNOW:   A Jalil-Olmedo (LAURIE) drain is used to remove fluids that build up in an area of your body after surgery  The LAURIE drain is a bulb shaped device connected to a tube  One end of the tube is placed inside you during surgery  The other end comes out through a small cut in your skin  The bulb is connected to this end  You may have a stitch to hold the tube in place     DISCHARGE INSTRUCTIONS:   Seek care immediately if:   · Your LAURIE drain breaks or comes out  · You have cloudy yellow or brown drainage from your LAURIE drain site, or the drainage smells bad  Contact your healthcare provider if:   · You drain less than 30 milliliters (2 tablespoons) in 24 hours  This may mean your drain can be removed  · You suddenly stop draining fluid or think your LAURIE drain is blocked  · You have a fever higher than 101 5°F (38 6°C)  · You have increased pain, redness, or swelling around the drain site  · You have questions about your LAURIE drain care  How a Jalil-Olmedo drain works: The LAURIE drain removes fluids by creating suction in the tube  The bulb is squeezed flat and connected to the tube that sticks out of your body  The bulb expands as it fills with fluid  How to change the bandage around your Jalil-Olmedo drain:  If you have a bandage, change it once a day  You may need to change your bandage more than once a day if it gets completely wet  · Wash your hands with soap and water  · Loosen the tape and gently remove the old bandage  Throw the old bandage into a plastic trash bag  · Use soap and water or saline (salt water) solution to clean your LAURIE drain site  Dip a cotton swab or gauze pad in the solution and gently clean your skin  · Pat the area dry  · Place a new bandage on your LAURIE drain site and secure it to your skin with medical tape  · Wash your hands  How to empty the Jalil-Olmedo drain:  Empty the bulb when it is half full or every 8 to 12 hours  · Wash your hands with soap and water  · Remove the plug from the bulb  · Pour the fluid into a measuring cup  · Clean the plug with an alcohol swab or a cotton ball dipped in rubbing alcohol  · Squeeze the bulb flat and put the plug back in  The bulb should stay flat until it starts to fill with fluid again  · Measure the amount of fluid you pour out   Write down how much fluid you empty from the LAURIE drain and the date and time you collected it     · Flush the fluid down the toilet  Wash your hands  Clear clogged tubing: Use the following steps to clear your Jalil-Olmedo tubing:  · Hold the tubing between your thumb and first finger at the place closest to your skin  This hand will prevent the tube from being pulled out of your skin  · Use your other thumb and first finger to slide the clog down the tubing toward the bulb  You may have to repeat the sliding until the tubing is unclogged  Jalil-Olmedo drain removal:  The amount of fluid that you drain will decrease as your wound heals  The LAURIE drain usually is removed when less than 30 milliliters (2 tablespoons) is collected in 24 hours  Ask your healthcare provider when and how your LAURIE drain will be removed  Follow up with your healthcare provider as directed:  Write down your questions so you remember to ask them during your visits  © 2017 [employer] Information is for End User's use only and may not be sold, redistributed or otherwise used for commercial purposes  All illustrations and images included in CareNotes® are the copyrighted property of [employer]  or [employer]  The above information is an  only  It is not intended as medical advice for individual conditions or treatments  Talk to your doctor, nurse or pharmacist before following any medical regimen to see if it is safe and effective for you

## 2018-09-13 NOTE — PLAN OF CARE
Problem: DISCHARGE PLANNING - CARE MANAGEMENT  Goal: Discharge to post-acute care or home with appropriate resources  INTERVENTIONS:  - Conduct assessment to determine patient/family and health care team treatment goals, and need for post-acute services based on payer coverage, community resources, and patient preferences, and barriers to discharge  - Address psychosocial, clinical, and financial barriers to discharge as identified in assessment in conjunction with the patient/family and health care team  - Arrange appropriate level of post-acute services according to patients   needs and preference and payer coverage in collaboration with the physician and health care team  - Communicate with and update the patient/family, physician, and health care team regarding progress on the discharge plan  - Arrange appropriate transportation to post-acute venues   Outcome: Progressing  CM met with pt, grandmother Priscilla Clark and friend Reza Webster at bedside  Pt is very upset  States that he was told by Dr Nora Rutherford yesterday that the LAURIE drain would be removed today and he could go home  Reza Webster has been waiting patiently to transport home  CM spoke to surgical PA and she said that Dr Destiny Newman is covering today and he needs to eval pt prior to d/c  Dr Destiny Newman notified that pt wants to see him asap  Pt wants to be discharged without [facility] services

## 2018-09-13 NOTE — MEDICAL STUDENT
MEDICAL STUDENT  Inpatient Progress Note for TRAINING ONLY  Not Part of Legal Medical Record       Progress Note - Rob Rutherford 23 y o  male MRN: [id number]    Unit/Bed#: -01 Encounter: [id number]      Assessment:  Assessment:  1  Ileitis with abscess secondary to possible Crohn's Disease as evidenced by elevated CRP (>90 H), elevated sed rate (24 H), and pending fecal calprotectin - IR drainage of abscess present   2  Leukocytosis - resolved, patient's WBC count is currently at 8 25    Plan:  1  Ileitis with abscess secondary to possible Crohn's Disease as evidenced by elevated CRP (>90 H), elevated sed rate (24 H), and pending fecal calprotectin - IR drainage of abscess present   - Continue current IV antibiotics, abscess drain, and pain control   - Monitor I&Os, drain output and continue serial abdominal exams   -Continue current regular diet as tolerated  - Have patient ambulate   - Tentative plan to discontinue drain before discharge   - Patient to follow up with up with GI on outpatient basis, colonoscopy to be done 3-4 weeks   2  Leukocytosis - resolved, patient's WBC count is currently at 8 25  - Continue serial labs     Subjective:   Patient is doing well, pain is reduced  Patient is tolerating regular diet, no nausea or diarrhea present with regular diet  Patient denies chest pain, shortness of breath, constipation, nausea, vomiting, diarrhea, blood in stool, and urinary pian  Patient admits to passing gas without pain  Objective:      Vitals: Blood pressure 135/85, pulse 81, temperature 98 7 °F (37 1 °C), temperature source Oral, resp  rate 18, height 5' 8" (1 727 m), weight 87 1 kg (192 lb 0 3 oz), SpO2 98 %  ,Body mass index is 29 2 kg/m²        Intake/Output Summary (Last 24 hours) at 09/13/18 0718  Last data filed at 09/12/18 1000   Gross per 24 hour   Intake              330 ml   Output                0 ml   Net              330 ml       Physical Exam: /85 (BP Location: Left arm) Pulse 81   Temp 98 7 °F (37 1 °C) (Oral)   Resp 18   Ht 5' 8" (1 727 m)   Wt 87 1 kg (192 lb 0 3 oz)   SpO2 98%   BMI 29 20 kg/m²   General appearance: alert and oriented, in no acute distress  Lungs: clear to auscultation bilaterally, no rhonchi or wheezes present   Heart: regular rate and rhythm, S1, S2 normal, no murmur, click, rub or gallop  Abdomen: Soft,non-tender, non-distended  Bowel sounds normoactive in all 4 quadrants, no guarding  LAURIE drain present on right side of abdomen, no erythema or purulent drainage  Color of fluid is hector  Extremities: extremities normal, warm and well-perfused; no cyanosis, clubbing, or edema     Invasive Devices     Peripheral Intravenous Line            Peripheral IV 09/10/18 Right Antecubital 2 days          Drain            Closed/Suction Drain Right RLQ Bulb 10 2 Fr  1 day                Lab, Imaging and other studies: I have personally reviewed pertinent reports          Ct Abdomen Pelvis With Contrast     Result Date: 9/10/2018  Impression: Severe distal ileitis could relate to inflammatory bowel disease  Infectious/inflammatory 1 9 x 1 9 x 9 4 cm (AP by transverse by craniocaudal) inflammatory/infectious fluid pocket/phlegmon or developing abscess identified in the right lower abdominal quadrant  Limited evaluation of the appendix as described above which may be involved  Workstation performed: [id number]      Ir Image Guided Drainage W Tube Placement     Result Date: 9/11/2018  Impression: Impression:  Ultrasound and fluoroscopy-guided placement of a 10-[place] all-purpose drainage catheter in a small right lower quadrant fluid collection  Aspiration yielded approximately 3 mL of cloudy hector-colored fluid  Workstation performed: [id number]     Results for Pat Macedo (MRN [id number]) as of 9/13/2018 07:20   Ref   Range 9/12/2018 05:00 9/13/2018 06:15   eGFR Latest Units: ml/min/1 73sq m 112 120   Sodium Latest Ref Range: 136 - 145 mmol/L 137 140   Potassium Latest Ref Range: 3 5 - 5 3 mmol/L 3 7 3 3 (L)   Chloride Latest Ref Range: 100 - 108 mmol/L 104 106   CO2 Latest Ref Range: 21 - 32 mmol/L 27 28   Anion Gap Latest Ref Range: 4 - 13 mmol/L 6 6   BUN Latest Ref Range: 5 - 25 mg/dL 6 8   Creatinine Latest Ref Range: 0 60 - 1 30 mg/dL 0 95 0 89   Glucose Latest Ref Range: 65 - 140 mg/dL 107 109   Calcium Latest Ref Range: 8 3 - 10 1 mg/dL 9 1 8 6   CALCIUM IONIZED Latest Ref Range: 1 12 - 1 32 mmol/L 1 13 1 09 (L)   Magnesium Latest Ref Range: 1 6 - 2 6 mg/dL 2 2 2 0   WBC Latest Ref Range: 4 31 - 10 16 Thousand/uL 8 76 8 25   RBC Latest Ref Range: 3 88 - 5 62 Million/uL 4 30 3 98   Hemoglobin Latest Ref Range: 12 0 - 17 0 g/dL 13 5 12 5   HCT Latest Ref Range: 36 5 - 49 3 % 39 9 37 3   MCV Latest Ref Range: 82 - 98 fL 93 94   MCH Latest Ref Range: 26 8 - 34 3 pg 31 4 31 4   MCHC Latest Ref Range: 31 4 - 37 4 g/dL 33 8 33 5   RDW Latest Ref Range: 11 6 - 15 1 % 12 2 12 3   Platelets Latest Ref Range: 149 - 390 Thousands/uL 253 280   MPV Latest Ref Range: 8 9 - 12 7 fL 11 3 10 5   nRBC Latest Units: /100 WBCs 0 0       Results for Margot Verdin (MRN [id number]) as of 9/13/2018 07:20   Ref  Range 9/12/2018 05:00 9/13/2018 06:15   eGFR Latest Units: ml/min/1 73sq m 112 120   Sodium Latest Ref Range: 136 - 145 mmol/L 137 140   Potassium Latest Ref Range: 3 5 - 5 3 mmol/L 3 7 3 3 (L)   Chloride Latest Ref Range: 100 - 108 mmol/L 104 106   CO2 Latest Ref Range: 21 - 32 mmol/L 27 28   Anion Gap Latest Ref Range: 4 - 13 mmol/L 6 6   BUN Latest Ref Range: 5 - 25 mg/dL 6 8   Creatinine Latest Ref Range: 0 60 - 1 30 mg/dL 0 95 0 89   Glucose Latest Ref Range: 65 - 140 mg/dL 107 109   Calcium Latest Ref Range: 8 3 - 10 1 mg/dL 9 1 8 6   CALCIUM IONIZED Latest Ref Range: 1 12 - 1 32 mmol/L 1 13 1 09 (L)   Magnesium Latest Ref Range: 1 6 - 2 6 mg/dL 2 2 2 0       Results for Margot Verdin (MRN [id number]) as of 9/12/2018 12:10    Ref   Range 9/11/2018 05:15   ERYTHROCYTE SEDIMENTATION RATE Latest Ref Range: 0 - 10 mm/hour 24 (H)      Results for Sheldon Richard (MRN [id number]) as of 9/12/2018 12:10    Ref   Range 9/11/2018 05:15   C-REACTIVE PROTEIN Latest Ref Range: <3 0 mg/L >90 0 (H)   Procalcitonin Latest Ref Range: <=0 25 ng/ml        VTE Pharmacologic Prophylaxis: Enoxaparin (Lovenox)  VTE Mechanical Prophylaxis: sequential compression device

## 2018-09-15 LAB
ANNOTATION COMMENT IMP: NORMAL
BACTERIA SPEC BFLD CULT: ABNORMAL
GAMMA INTERFERON BACKGROUND BLD IA-ACNC: 0.04 IU/ML
GRAM STN SPEC: ABNORMAL
GRAM STN SPEC: ABNORMAL
M TB IFN-G BLD-IMP: NEGATIVE
M TB IFN-G CD4+ BCKGRND COR BLD-ACNC: <0.01 IU/ML
M TB IFN-G CD4+ T-CELLS BLD-ACNC: 0.03 IU/ML
MITOGEN IGNF BLD-ACNC: 7.79 IU/ML
QUANTIFERON-TB GOLD IN TUBE: NORMAL
SERVICE CMNT-IMP: NORMAL

## 2018-09-19 ENCOUNTER — TELEPHONE (OUTPATIENT)
Dept: SURGERY | Facility: CLINIC | Age: 23
End: 2018-09-19

## 2021-12-23 ENCOUNTER — NURSE TRIAGE (OUTPATIENT)
Dept: OTHER | Facility: OTHER | Age: 26
End: 2021-12-23

## 2021-12-23 DIAGNOSIS — U07.1 COVID-19: Primary | ICD-10-CM

## 2021-12-24 PROCEDURE — U0005 INFEC AGEN DETEC AMPLI PROBE: HCPCS | Performed by: FAMILY MEDICINE

## 2021-12-24 PROCEDURE — U0003 INFECTIOUS AGENT DETECTION BY NUCLEIC ACID (DNA OR RNA); SEVERE ACUTE RESPIRATORY SYNDROME CORONAVIRUS 2 (SARS-COV-2) (CORONAVIRUS DISEASE [COVID-19]), AMPLIFIED PROBE TECHNIQUE, MAKING USE OF HIGH THROUGHPUT TECHNOLOGIES AS DESCRIBED BY CMS-2020-01-R: HCPCS | Performed by: FAMILY MEDICINE

## 2023-03-01 NOTE — PLAN OF CARE
Problem: DISCHARGE PLANNING - CARE MANAGEMENT  Goal: Discharge to post-acute care or home with appropriate resources  INTERVENTIONS:  - Conduct assessment to determine patient/family and health care team treatment goals, and need for post-acute services based on payer coverage, community resources, and patient preferences, and barriers to discharge  - Address psychosocial, clinical, and financial barriers to discharge as identified in assessment in conjunction with the patient/family and health care team  - Arrange appropriate level of post-acute services according to patient's   needs and preference and payer coverage in collaboration with the physician and health care team  - Communicate with and update the patient/family, physician, and health care team regarding progress on the discharge plan  - Arrange appropriate transportation to post-acute venues   Outcome: Completed Date Met: 09/13/18 V-Y Plasty Text: The defect edges were debeveled with a #15 scalpel blade.  Given the location of the defect, shape of the defect and the proximity to free margins an V-Y advancement flap was deemed most appropriate.  Using a sterile surgical marker, an appropriate advancement flap was drawn incorporating the defect and placing the expected incisions within the relaxed skin tension lines where possible.    The area thus outlined was incised deep to adipose tissue with a #15 scalpel blade.  The skin margins were undermined to an appropriate distance in all directions utilizing iris scissors.

## 2023-06-23 ENCOUNTER — HOSPITAL ENCOUNTER (EMERGENCY)
Facility: HOSPITAL | Age: 28
Discharge: HOME | End: 2023-06-23
Attending: EMERGENCY MEDICINE
Payer: COMMERCIAL

## 2023-06-23 VITALS
OXYGEN SATURATION: 100 % | RESPIRATION RATE: 16 BRPM | SYSTOLIC BLOOD PRESSURE: 132 MMHG | TEMPERATURE: 98.7 F | DIASTOLIC BLOOD PRESSURE: 59 MMHG | HEART RATE: 60 BPM

## 2023-06-23 DIAGNOSIS — K04.7 DENTAL INFECTION: Primary | ICD-10-CM

## 2023-06-23 PROCEDURE — 99282 EMERGENCY DEPT VISIT SF MDM: CPT

## 2023-06-23 RX ORDER — OXYCODONE HYDROCHLORIDE 5 MG/1
5 TABLET ORAL EVERY 4 HOURS PRN
Qty: 15 TABLET | Refills: 0 | Status: SHIPPED | OUTPATIENT
Start: 2023-06-23 | End: 2023-07-08

## 2023-06-23 RX ORDER — PENICILLIN V POTASSIUM 250 MG/1
500 TABLET ORAL ONCE
Status: COMPLETED | OUTPATIENT
Start: 2023-06-23 | End: 2023-06-23

## 2023-06-23 RX ORDER — OXYCODONE HYDROCHLORIDE AND ACETAMINOPHEN 5; 325 MG/1; MG/1
1 TABLET ORAL ONCE
Status: COMPLETED | OUTPATIENT
Start: 2023-06-23 | End: 2023-06-23

## 2023-06-23 RX ORDER — PENICILLIN V POTASSIUM 500 MG/1
500 TABLET ORAL 4 TIMES DAILY
Qty: 40 TABLET | Refills: 0 | Status: SHIPPED | OUTPATIENT
Start: 2023-06-23 | End: 2023-07-03

## 2023-06-23 RX ADMIN — OXYCODONE HYDROCHLORIDE AND ACETAMINOPHEN 1 TABLET: 5; 325 TABLET ORAL at 12:10

## 2023-06-23 RX ADMIN — PENICILLIN V POTASSIUM 500 MG: 250 TABLET, FILM COATED ORAL at 12:10

## 2023-06-23 NOTE — DISCHARGE INSTRUCTIONS
Penicillin every 6 hours to fight infection  Oxycodone 1 every 6 hours if needed for pain caution narcotic will make you sleepy, highly addictive medicine  Follow-up with maxillofacial surgery for further evaluation possible extraction

## 2023-06-23 NOTE — ED PROVIDER NOTES
History  Chief Complaint   Patient presents with   • Dental Pain     C/o right lower dental pain, states his face is getting numb from the pain  HPI patient is a 38-year-old male, complains of pain on his right lower jaw over his right posterior molar and where his wisdom tooth would come in  Complains of pain with palpation radiates up onto his head  Denies any trauma  He denies any bleeding in his mouth  Denies any significant swelling but reports area around it feels numb and full like it is going to swell  Patient reports using Tylenol Motrin over-the-counter without relief  Past medical history ileitis  Family history noncontributory  Social history, motor, denies drug abuse  Prior to Admission Medications   Prescriptions Last Dose Informant Patient Reported? Taking? Acetaminophen (TYLENOL EXTRA STRENGTH PO)   Yes No   Sig: Take 2 tablets by mouth every 6 (six) hours as needed   aspirin 500 MG tablet   Yes No   Sig: Take 500 mg by mouth every 4 (four) hours as needed For pain      Facility-Administered Medications: None       History reviewed  No pertinent past medical history  Past Surgical History:   Procedure Laterality Date   • IR DRAINAGE TUBE PLACEMENT  9/11/2018       History reviewed  No pertinent family history  I have reviewed and agree with the history as documented  E-Cigarette/Vaping     E-Cigarette/Vaping Substances     Social History     Tobacco Use   • Smoking status: Every Day     Packs/day: 0 50     Types: Cigarettes   • Smokeless tobacco: Never   Substance Use Topics   • Alcohol use: Yes     Comment: social   • Drug use: Yes     Types: Marijuana     Comment: social       Review of Systems   Constitutional: Negative for fever  HENT: Positive for dental problem  Negative for congestion  Eyes: Negative for pain and redness  Respiratory: Negative for cough and shortness of breath  Cardiovascular: Negative for chest pain     Gastrointestinal: Negative for abdominal pain and vomiting  Physical Exam  Physical Exam  Vitals and nursing note reviewed  Constitutional:       Appearance: He is well-developed  HENT:      Head: Normocephalic  Right Ear: External ear normal       Left Ear: External ear normal       Nose: Nose normal       Mouth/Throat:      Comments: There is tenderness on the patient's right lower mandible over his posterior molar and the area of his wisdom tooth, no obvious sign of abscess or drainage, no significant swelling, no swelling under the tongue no stridor no drooling  Eyes:      General: Lids are normal       Pupils: Pupils are equal, round, and reactive to light  Pulmonary:      Effort: Pulmonary effort is normal  No respiratory distress  Musculoskeletal:         General: No deformity  Normal range of motion  Cervical back: Normal range of motion and neck supple  Skin:     General: Skin is warm and dry  Neurological:      Mental Status: He is alert and oriented to person, place, and time           Vital Signs  ED Triage Vitals [06/23/23 1151]   Temperature Pulse Respirations Blood Pressure SpO2   98 7 °F (37 1 °C) 60 16 132/59 100 %      Temp src Heart Rate Source Patient Position - Orthostatic VS BP Location FiO2 (%)   -- Monitor Sitting Right arm --      Pain Score       8           Vitals:    06/23/23 1151   BP: 132/59   Pulse: 60   Patient Position - Orthostatic VS: Sitting         Visual Acuity      ED Medications  Medications   penicillin V potassium (VEETID) tablet 500 mg (500 mg Oral Given 6/23/23 1210)   oxyCODONE-acetaminophen (PERCOCET) 5-325 mg per tablet 1 tablet (1 tablet Oral Given 6/23/23 1210)       Diagnostic Studies  Results Reviewed     None                 No orders to display              Procedures  Procedures         ED Course                                             Medical Decision Making  Decision making 26-year-old male presents with pain and tenderness over his right lower molar and erupting wisdom tooth consistent with infection, no drainable abscess  No stridor no drooling no intraoral pathology other than dental pain  Discussed treatment antibiotics  Discussed narcotic analgesics and risk benefit of narcotics  Shared decision making  Discussed treatment of follow-up discussed indications to return  Dental infection: acute illness or injury  Risk  Prescription drug management  Disposition  Final diagnoses:   Dental infection     Time reflects when diagnosis was documented in both MDM as applicable and the Disposition within this note     Time User Action Codes Description Comment    6/23/2023 12:00 PM Destiny Antony [K04 7] Dental infection       ED Disposition     ED Disposition   Discharge    Condition   Stable    Date/Time   Fri Jun 23, 2023 12:00 PM    Comment   Stacy Demetrius discharge to home/self care  Follow-up Information     Follow up With Specialties Details Why [address] [facility]  [facility] [address]          Discharge Medication List as of 6/23/2023 12:02 PM      START taking these medications    Details   oxyCODONE (Roxicodone) 5 immediate release tablet Take 1 tablet (5 mg total) by mouth every 4 (four) hours as needed for moderate pain for up to 15 days Max Daily Amount: 30 mg, Starting Fri 6/23/2023, Until Sat 7/8/2023 at 2359, Normal      penicillin V potassium (VEETID) 500 mg tablet Take 1 tablet (500 mg total) by mouth 4 (four) times a day for 10 days, Starting Fri 6/23/2023, Until Mon 7/3/2023, Normal         CONTINUE these medications which have NOT CHANGED    Details   Acetaminophen (TYLENOL EXTRA STRENGTH PO) Take 2 tablets by mouth every 6 (six) hours as needed, Historical Med      aspirin 500 MG tablet Take 500 mg by mouth every 4 (four) hours as needed For pain, Historical Med             No discharge procedures on file      PDMP Review None          ED Provider  Electronically Signed by           Mor Wing MD  06/23/23 6400

## 2025-06-13 ENCOUNTER — OFFICE VISIT (OUTPATIENT)
Dept: URGENT CARE | Facility: CLINIC | Age: 30
End: 2025-06-13
Payer: COMMERCIAL

## 2025-06-13 VITALS
RESPIRATION RATE: 16 BRPM | TEMPERATURE: 98.3 F | SYSTOLIC BLOOD PRESSURE: 134 MMHG | WEIGHT: 212 LBS | BODY MASS INDEX: 32.23 KG/M2 | DIASTOLIC BLOOD PRESSURE: 79 MMHG | OXYGEN SATURATION: 98 % | HEART RATE: 60 BPM

## 2025-06-13 DIAGNOSIS — M62.830 SPASM OF RIGHT TRAPEZIUS MUSCLE: Primary | ICD-10-CM

## 2025-06-13 PROCEDURE — 99203 OFFICE O/P NEW LOW 30 MIN: CPT | Performed by: PHYSICIAN ASSISTANT

## 2025-06-13 RX ORDER — METHYLPREDNISOLONE 4 MG/1
TABLET ORAL
Qty: 1 EACH | Refills: 0 | Status: SHIPPED | OUTPATIENT
Start: 2025-06-13

## 2025-06-13 RX ORDER — METHOCARBAMOL 500 MG/1
500 TABLET, FILM COATED ORAL 3 TIMES DAILY PRN
Qty: 40 TABLET | Refills: 0 | Status: SHIPPED | OUTPATIENT
Start: 2025-06-13

## 2025-06-13 NOTE — PROGRESS NOTES
[facility]        NAME: Deo Miller is a 29 y.o. male  : 1995    MRN: [id number]  DATE: 2025  TIME: 2:02 PM    Assessment and Plan   Spasm of right trapezius muscle [M62.830]  1. Spasm of right trapezius muscle  methocarbamol (ROBAXIN) 500 mg tablet    methylPREDNISolone 4 MG tablet therapy pack          Also recommended use of heating pad.  Follow-up with PCP if no improvement he might benefit from physical therapy as well.    The patient and/or parent/legal guardian verbalized understanding of exam findings and   Treatment plan. We engaged in the shared decision-making process and treatment options were   discussed at length with the patient.  All questions, concerns and complaints were answered and   addressed to the patient's' and/or parent/legal guardians's satisfaction.    Patient Instructions   There are no Patient Instructions on file for this visit.    Follow up with PCP in 3-5 days.  Proceed to  ER if symptoms worsen.    If tests are performed, our office will contact you with results only if   changes need to made to the care plan discussed with you at the visit.   [facility]     Chief Complaint     Chief Complaint   Patient presents with   • Neck Pain     Pt c/o right sided neck pain that radiates to shoulder that started on Monday.         History of Present Illness       HPI  Patient presents complaining of right-sided neck pain that radiates to the shoulder began on Monday when he first woke up.  Reports has been getting worse throughout the week.  He denies any fevers or chills.  No confusion.  No headache.  No symptoms of respiratory illness.  No injury to the shoulder or neck.  He feels stiffness due to pain and tightness with motion of the neck.    Review of Systems   Review of Systems  All other related systems reviewed with patient or accompanying historian and are negative except as noted in HPI    Current Medications  "    Current Medications[1]    Current Allergies     Allergies as of 06/13/2025   • (No Known Allergies)            The following portions of the patient's history were reviewed and updated as appropriate: allergies, current medications, past family history, past medical history, past social history, past surgical history and problem list.     Past Medical History[2]    Past Surgical History[3]    Family History[4]      Medications have been verified.        Objective   /79   Pulse 60   Temp 98.3 °F (36.8 °C) (Temporal)   Resp 16   Wt 96.2 kg (212 lb)   SpO2 98%   BMI 32.23 kg/m²   No LMP for male patient.       Physical Exam     Physical Exam  Constitutional:       General: He is not in acute distress.     Appearance: He is well-developed.   HENT:      Head: Normocephalic and atraumatic.     Eyes:      General: No scleral icterus.     Conjunctiva/sclera: Conjunctivae normal.     Pulmonary:      Effort: Pulmonary effort is normal. No respiratory distress.      Breath sounds: No stridor.     Musculoskeletal:      Cervical back: Normal range of motion and neck supple. No rigidity.      Comments: Tenderness palpation along the trapezius and the right side of the neck range of motion is somewhat limited mostly rotation to the right greater than left in the neck but is intact there is no nuchal rigidity     Skin:     General: Skin is warm and dry.     Neurological:      Mental Status: He is alert and oriented to person, place, and time.     Psychiatric:         Behavior: Behavior normal.         Ortho Exam        Procedures  No Procedures performed today        Note: Portions of this record may have been created with voice recognition software. Occasional wrong word or \"sound a like\" substitutions may have occurred due to the inherent limitations of voice recognition software. Please read the chart carefully and recognize, using context, where substitutions have occurred.*             [1]    Current Outpatient " Medications:   •  methocarbamol (ROBAXIN) 500 mg tablet, Take 1 tablet (500 mg total) by mouth 3 (three) times a day as needed for muscle spasms, Disp: 40 tablet, Rfl: 0  •  methylPREDNISolone 4 MG tablet therapy pack, Use as directed on package, Disp: 1 each, Rfl: 0  •  Acetaminophen (TYLENOL EXTRA STRENGTH PO), Take 2 tablets by mouth every 6 (six) hours as needed, Disp: , Rfl:   •  aspirin 500 MG tablet, Take 500 mg by mouth every 4 (four) hours as needed For pain, Disp: , Rfl: [2]  No past medical history on file.[3]  Past Surgical History:  Procedure Laterality Date   • IR DRAINAGE TUBE PLACEMENT  9/11/2018   [4]  No family history on file.

## 2025-06-13 NOTE — LETTER
June 13, 2025     Patient: Deo Miller   YOB: 1995   Date of Visit: 6/13/2025       To Whom It May Concern:    It is my medical opinion that Deo Miller may return to work on 6/16/25.    If you have any questions or concerns, please don't hesitate to call.         Sincerely,        Jj Howard PA-C    CC: No Recipients